# Patient Record
Sex: FEMALE | Race: WHITE | Employment: OTHER | URBAN - METROPOLITAN AREA
[De-identification: names, ages, dates, MRNs, and addresses within clinical notes are randomized per-mention and may not be internally consistent; named-entity substitution may affect disease eponyms.]

---

## 2017-01-05 ENCOUNTER — TELEPHONE (OUTPATIENT)
Dept: INTERNAL MEDICINE CLINIC | Facility: CLINIC | Age: 82
End: 2017-01-05

## 2017-01-05 NOTE — TELEPHONE ENCOUNTER
[facility]---[phone]    Re: [place]    Wanted to give you her blood pressure readings for the past week:    12-30-16----143/64  12-31-16-----145/60    1-1-17------147/64    1-2-17-----153/64    1-3-17-----168/70    1-4-17---

## 2017-01-16 ENCOUNTER — OFFICE VISIT (OUTPATIENT)
Dept: INTERNAL MEDICINE CLINIC | Facility: CLINIC | Age: 82
End: 2017-01-16

## 2017-01-16 VITALS
HEART RATE: 60 BPM | BODY MASS INDEX: 31.91 KG/M2 | WEIGHT: 169 LBS | TEMPERATURE: 98 F | SYSTOLIC BLOOD PRESSURE: 144 MMHG | DIASTOLIC BLOOD PRESSURE: 50 MMHG | HEIGHT: 61 IN

## 2017-01-16 DIAGNOSIS — E11.9 TYPE 2 DIABETES MELLITUS WITHOUT COMPLICATION, WITHOUT LONG-TERM CURRENT USE OF INSULIN (HCC): ICD-10-CM

## 2017-01-16 DIAGNOSIS — I50.21 ACUTE SYSTOLIC CONGESTIVE HEART FAILURE (HCC): ICD-10-CM

## 2017-01-16 DIAGNOSIS — I48.20 CHRONIC ATRIAL FIBRILLATION (HCC): ICD-10-CM

## 2017-01-16 DIAGNOSIS — I10 ESSENTIAL HYPERTENSION WITH GOAL BLOOD PRESSURE LESS THAN 130/85: Primary | ICD-10-CM

## 2017-01-16 LAB
ANION GAP SERPL CALC-SCNC: 12 MMOL/L (ref 0–18)
BUN SERPL-MCNC: 45 MG/DL (ref 8–20)
BUN/CREAT SERPL: 34.9 (ref 10–20)
CALCIUM SERPL-MCNC: 9 MG/DL (ref 8.5–10.5)
CHLORIDE SERPL-SCNC: 104 MMOL/L (ref 95–110)
CO2 SERPL-SCNC: 20 MMOL/L (ref 22–32)
CREAT SERPL-MCNC: 1.29 MG/DL (ref 0.5–1.5)
GLUCOSE SERPL-MCNC: 109 MG/DL (ref 70–99)
OSMOLALITY UR CALC.SUM OF ELEC: 294 MOSM/KG (ref 275–295)
POTASSIUM SERPL-SCNC: 4.5 MMOL/L (ref 3.3–5.1)
SODIUM SERPL-SCNC: 136 MMOL/L (ref 136–144)

## 2017-01-16 PROCEDURE — 36415 COLL VENOUS BLD VENIPUNCTURE: CPT | Performed by: INTERNAL MEDICINE

## 2017-01-16 PROCEDURE — G0463 HOSPITAL OUTPT CLINIC VISIT: HCPCS | Performed by: INTERNAL MEDICINE

## 2017-01-16 PROCEDURE — 99214 OFFICE O/P EST MOD 30 MIN: CPT | Performed by: INTERNAL MEDICINE

## 2017-01-16 NOTE — PROGRESS NOTES
HPI:    Patient ID: Travis Adame is a 80year old female. Hypertension  This is a chronic problem. The current episode started more than 1 year ago. The problem is unchanged. The problem is controlled.  Pertinent negatives include no anxiety, blurred vi neck pain and neck stiffness. Allergic/Immunologic: Negative for immunocompromised state. Neurological: Negative for dizziness, syncope, facial asymmetry, weakness, light-headedness, numbness and headaches.             Current Outpatient Prescriptions: Abdominal: She exhibits no distension and no mass. There is no tenderness. There is no rebound and no guarding. Musculoskeletal: She exhibits edema. She exhibits no tenderness. Neurological: She is alert and oriented to person, place, and time.  She e

## 2017-02-13 RX ORDER — HYDROCODONE BITARTRATE AND ACETAMINOPHEN 10; 325 MG/1; MG/1
1 TABLET ORAL EVERY 4 HOURS PRN
Qty: 120 TABLET | Refills: 0 | Status: SHIPPED | OUTPATIENT
Start: 2017-02-13 | End: 2017-04-10

## 2017-03-27 ENCOUNTER — OFFICE VISIT (OUTPATIENT)
Dept: INTERNAL MEDICINE CLINIC | Facility: CLINIC | Age: 82
End: 2017-03-27

## 2017-03-27 VITALS
HEIGHT: 61 IN | DIASTOLIC BLOOD PRESSURE: 50 MMHG | WEIGHT: 164.63 LBS | TEMPERATURE: 98 F | BODY MASS INDEX: 31.08 KG/M2 | HEART RATE: 68 BPM | SYSTOLIC BLOOD PRESSURE: 140 MMHG

## 2017-03-27 DIAGNOSIS — I48.20 CHRONIC ATRIAL FIBRILLATION (HCC): ICD-10-CM

## 2017-03-27 DIAGNOSIS — E78.00 PURE HYPERCHOLESTEROLEMIA: ICD-10-CM

## 2017-03-27 DIAGNOSIS — E11.9 TYPE 2 DIABETES MELLITUS WITHOUT COMPLICATION, WITHOUT LONG-TERM CURRENT USE OF INSULIN (HCC): ICD-10-CM

## 2017-03-27 DIAGNOSIS — I50.21 ACUTE SYSTOLIC CONGESTIVE HEART FAILURE (HCC): ICD-10-CM

## 2017-03-27 DIAGNOSIS — I10 ESSENTIAL HYPERTENSION WITH GOAL BLOOD PRESSURE LESS THAN 130/85: Primary | ICD-10-CM

## 2017-03-27 LAB
ALBUMIN SERPL BCP-MCNC: 3.9 G/DL (ref 3.5–4.8)
ALBUMIN/GLOB SERPL: 1.6 {RATIO} (ref 1–2)
ALP SERPL-CCNC: 80 U/L (ref 32–100)
ALT SERPL-CCNC: 13 U/L (ref 14–54)
ANION GAP SERPL CALC-SCNC: 7 MMOL/L (ref 0–18)
AST SERPL-CCNC: 19 U/L (ref 15–41)
BASOPHILS # BLD: 0 K/UL (ref 0–0.2)
BASOPHILS NFR BLD: 0 %
BILIRUB SERPL-MCNC: 0.4 MG/DL (ref 0.3–1.2)
BUN SERPL-MCNC: 40 MG/DL (ref 8–20)
BUN/CREAT SERPL: 33.9 (ref 10–20)
CALCIUM SERPL-MCNC: 9.1 MG/DL (ref 8.5–10.5)
CHLORIDE SERPL-SCNC: 108 MMOL/L (ref 95–110)
CO2 SERPL-SCNC: 24 MMOL/L (ref 22–32)
CREAT SERPL-MCNC: 1.18 MG/DL (ref 0.5–1.5)
EOSINOPHIL # BLD: 0.2 K/UL (ref 0–0.7)
EOSINOPHIL NFR BLD: 4 %
ERYTHROCYTE [DISTWIDTH] IN BLOOD BY AUTOMATED COUNT: 16.9 % (ref 11–15)
GLOBULIN PLAS-MCNC: 2.4 G/DL (ref 2.5–3.7)
GLUCOSE SERPL-MCNC: 114 MG/DL (ref 70–99)
HCT VFR BLD AUTO: 33.1 % (ref 35–48)
HGB BLD-MCNC: 10.9 G/DL (ref 12–16)
LYMPHOCYTES # BLD: 0.9 K/UL (ref 1–4)
LYMPHOCYTES NFR BLD: 14 %
MCH RBC QN AUTO: 29.6 PG (ref 27–32)
MCHC RBC AUTO-ENTMCNC: 32.8 G/DL (ref 32–37)
MCV RBC AUTO: 90.4 FL (ref 80–100)
MONOCYTES # BLD: 0.5 K/UL (ref 0–1)
MONOCYTES NFR BLD: 8 %
NEUTROPHILS # BLD AUTO: 4.9 K/UL (ref 1.8–7.7)
NEUTROPHILS NFR BLD: 75 %
OSMOLALITY UR CALC.SUM OF ELEC: 299 MOSM/KG (ref 275–295)
PLATELET # BLD AUTO: 201 K/UL (ref 140–400)
PMV BLD AUTO: 7.9 FL (ref 7.4–10.3)
POTASSIUM SERPL-SCNC: 4.4 MMOL/L (ref 3.3–5.1)
PROT SERPL-MCNC: 6.3 G/DL (ref 5.9–8.4)
RBC # BLD AUTO: 3.67 M/UL (ref 3.7–5.4)
SODIUM SERPL-SCNC: 139 MMOL/L (ref 136–144)
WBC # BLD AUTO: 6.5 K/UL (ref 4–11)

## 2017-03-27 PROCEDURE — 99214 OFFICE O/P EST MOD 30 MIN: CPT | Performed by: INTERNAL MEDICINE

## 2017-03-27 PROCEDURE — G0463 HOSPITAL OUTPT CLINIC VISIT: HCPCS | Performed by: INTERNAL MEDICINE

## 2017-03-27 NOTE — PROGRESS NOTES
HPI:    Patient ID: Christopher Sherwood is a 80year old female. Hypertension  This is a chronic problem. The current episode started more than 1 year ago. The problem is unchanged. The problem is controlled.  Pertinent negatives include no anxiety, blurred vi Negative for dizziness, syncope, facial asymmetry, weakness, light-headedness, numbness and headaches. Current Outpatient Prescriptions:  HYDROcodone-acetaminophen  MG Oral Tab Take 1 tablet by mouth every 4 (four) hours as needed.  Disp: 1 exhibits no edema or tenderness. Neurological: She is alert and oriented to person, place, and time. She exhibits normal muscle tone. Coordination and gait abnormal.   Walks with walker. Skin: Skin is warm. No rash noted. No erythema. No pallor.    Psy

## 2017-04-10 RX ORDER — HYDROCODONE BITARTRATE AND ACETAMINOPHEN 10; 325 MG/1; MG/1
1 TABLET ORAL EVERY 4 HOURS PRN
Qty: 120 TABLET | Refills: 0 | Status: SHIPPED | OUTPATIENT
Start: 2017-04-10 | End: 2017-06-05

## 2017-05-19 RX ORDER — PANTOPRAZOLE SODIUM 40 MG/1
TABLET, DELAYED RELEASE ORAL
Qty: 30 TABLET | Refills: 11 | Status: SHIPPED | OUTPATIENT
Start: 2017-05-19 | End: 2018-04-26

## 2017-05-19 RX ORDER — ALPRAZOLAM 0.25 MG/1
TABLET ORAL
Qty: 30 TABLET | Refills: 5 | Status: SHIPPED
Start: 2017-05-19 | End: 2017-12-18

## 2017-06-05 RX ORDER — HYDROCODONE BITARTRATE AND ACETAMINOPHEN 10; 325 MG/1; MG/1
1 TABLET ORAL EVERY 4 HOURS PRN
Qty: 120 TABLET | Refills: 0 | Status: SHIPPED | OUTPATIENT
Start: 2017-06-05 | End: 2017-07-31

## 2017-06-26 ENCOUNTER — OFFICE VISIT (OUTPATIENT)
Dept: INTERNAL MEDICINE CLINIC | Facility: CLINIC | Age: 82
End: 2017-06-26

## 2017-06-26 VITALS
WEIGHT: 166 LBS | BODY MASS INDEX: 31.34 KG/M2 | DIASTOLIC BLOOD PRESSURE: 66 MMHG | SYSTOLIC BLOOD PRESSURE: 110 MMHG | TEMPERATURE: 98 F | HEIGHT: 61 IN | HEART RATE: 60 BPM

## 2017-06-26 DIAGNOSIS — I48.20 CHRONIC ATRIAL FIBRILLATION (HCC): ICD-10-CM

## 2017-06-26 DIAGNOSIS — I50.21 ACUTE SYSTOLIC CONGESTIVE HEART FAILURE (HCC): ICD-10-CM

## 2017-06-26 DIAGNOSIS — E11.9 TYPE 2 DIABETES MELLITUS WITHOUT COMPLICATION, WITHOUT LONG-TERM CURRENT USE OF INSULIN (HCC): ICD-10-CM

## 2017-06-26 DIAGNOSIS — I10 ESSENTIAL HYPERTENSION WITH GOAL BLOOD PRESSURE LESS THAN 130/85: Primary | ICD-10-CM

## 2017-06-26 PROCEDURE — 99214 OFFICE O/P EST MOD 30 MIN: CPT | Performed by: INTERNAL MEDICINE

## 2017-06-26 PROCEDURE — G0463 HOSPITAL OUTPT CLINIC VISIT: HCPCS | Performed by: INTERNAL MEDICINE

## 2017-06-26 PROCEDURE — 36415 COLL VENOUS BLD VENIPUNCTURE: CPT | Performed by: INTERNAL MEDICINE

## 2017-06-26 RX ORDER — FUROSEMIDE 20 MG/1
20 TABLET ORAL AS NEEDED
Refills: 0 | Status: ON HOLD | COMMUNITY
Start: 2017-05-01 | End: 2019-01-03

## 2017-06-26 RX ORDER — BETAMETHASONE DIPROPIONATE 0.5 MG/G
OINTMENT TOPICAL
Qty: 15 G | Refills: 4 | Status: ON HOLD | OUTPATIENT
Start: 2017-06-26 | End: 2018-11-04

## 2017-06-26 NOTE — PROGRESS NOTES
HPI:    Patient ID: Meka Puckett is a 80year old female. Hypertension   This is a chronic problem. The current episode started more than 1 year ago. The problem is unchanged. The problem is controlled. Associated symptoms include peripheral edema.  Per Neurological: Negative for dizziness, syncope, facial asymmetry, weakness, light-headedness, numbness and headaches. Current Outpatient Prescriptions:  furosemide 20 MG Oral Tab Take 20 mg by mouth as needed.    Disp:  Rfl: 0   Betamethasone Bakari Hensley Abdominal: She exhibits no distension and no mass. There is no tenderness. There is no rebound and no guarding. Musculoskeletal: She exhibits edema. She exhibits no tenderness. Neurological: She is alert and oriented to person, place, and time.  She e

## 2017-07-31 RX ORDER — HYDROCODONE BITARTRATE AND ACETAMINOPHEN 10; 325 MG/1; MG/1
1 TABLET ORAL EVERY 4 HOURS PRN
Qty: 120 TABLET | Refills: 0 | Status: SHIPPED | OUTPATIENT
Start: 2017-07-31 | End: 2017-09-25

## 2017-08-21 RX ORDER — VALSARTAN AND HYDROCHLOROTHIAZIDE 320; 25 MG/1; MG/1
TABLET, FILM COATED ORAL
Qty: 90 TABLET | Refills: 3 | Status: SHIPPED | OUTPATIENT
Start: 2017-08-21 | End: 2018-07-30

## 2017-09-25 ENCOUNTER — OFFICE VISIT (OUTPATIENT)
Dept: INTERNAL MEDICINE CLINIC | Facility: CLINIC | Age: 82
End: 2017-09-25

## 2017-09-25 VITALS
TEMPERATURE: 99 F | DIASTOLIC BLOOD PRESSURE: 58 MMHG | SYSTOLIC BLOOD PRESSURE: 116 MMHG | WEIGHT: 158 LBS | HEIGHT: 61 IN | HEART RATE: 64 BPM | BODY MASS INDEX: 29.83 KG/M2

## 2017-09-25 DIAGNOSIS — E78.00 PURE HYPERCHOLESTEROLEMIA: ICD-10-CM

## 2017-09-25 DIAGNOSIS — I10 ESSENTIAL HYPERTENSION WITH GOAL BLOOD PRESSURE LESS THAN 130/85: Primary | ICD-10-CM

## 2017-09-25 DIAGNOSIS — I48.20 CHRONIC ATRIAL FIBRILLATION (HCC): ICD-10-CM

## 2017-09-25 DIAGNOSIS — E11.9 TYPE 2 DIABETES MELLITUS WITHOUT COMPLICATION, WITHOUT LONG-TERM CURRENT USE OF INSULIN (HCC): ICD-10-CM

## 2017-09-25 PROCEDURE — 99214 OFFICE O/P EST MOD 30 MIN: CPT | Performed by: INTERNAL MEDICINE

## 2017-09-25 PROCEDURE — G0463 HOSPITAL OUTPT CLINIC VISIT: HCPCS | Performed by: INTERNAL MEDICINE

## 2017-09-25 PROCEDURE — 36415 COLL VENOUS BLD VENIPUNCTURE: CPT | Performed by: INTERNAL MEDICINE

## 2017-09-25 PROCEDURE — G0008 ADMIN INFLUENZA VIRUS VAC: HCPCS | Performed by: INTERNAL MEDICINE

## 2017-09-25 PROCEDURE — 90653 IIV ADJUVANT VACCINE IM: CPT | Performed by: INTERNAL MEDICINE

## 2017-09-25 RX ORDER — HYDROCODONE BITARTRATE AND ACETAMINOPHEN 10; 325 MG/1; MG/1
1 TABLET ORAL EVERY 4 HOURS PRN
Qty: 120 TABLET | Refills: 0 | Status: SHIPPED | OUTPATIENT
Start: 2017-09-25 | End: 2017-11-20

## 2017-09-25 NOTE — PROGRESS NOTES
HPI:    Patient ID: Juani Delgado is a 80year old female. Hypertension   This is a chronic problem. The current episode started more than 1 year ago. The problem is unchanged. The problem is controlled.  Pertinent negatives include no anxiety, blurred v immunocompromised state. Neurological: Negative for dizziness, syncope, facial asymmetry, weakness, light-headedness, numbness and headaches.             Current Outpatient Prescriptions:  HYDROcodone-acetaminophen  MG Oral Tab Take 1 tablet by mout exhibits no tenderness. Abdominal: She exhibits no distension and no mass. There is no tenderness. There is no rebound and no guarding. Musculoskeletal: She exhibits no edema or tenderness. Ambulates with a walker.     Neurological: She is alert and o

## 2017-09-26 LAB
ABSOLUTE BASOPHILS: 21 CELLS/UL (ref 0–200)
ABSOLUTE EOSINOPHILS: 161 CELLS/UL (ref 15–500)
ABSOLUTE LYMPHOCYTES: 847 CELLS/UL (ref 850–3900)
ABSOLUTE MONOCYTES: 441 CELLS/UL (ref 200–950)
ABSOLUTE NEUTROPHILS: 5530 CELLS/UL (ref 1500–7800)
ALBUMIN/GLOBULIN RATIO: 2 (CALC) (ref 1–2.5)
ALBUMIN: 4.3 G/DL (ref 3.6–5.1)
ALKALINE PHOSPHATASE: 77 U/L (ref 33–130)
ALT: 10 U/L (ref 6–29)
AST: 13 U/L (ref 10–35)
BASOPHILS: 0.3 %
BILIRUBIN, TOTAL: 0.4 MG/DL (ref 0.2–1.2)
BUN/CREATININE RATIO: 28 (CALC) (ref 6–22)
BUN: 32 MG/DL (ref 7–25)
CALCIUM: 9.2 MG/DL (ref 8.6–10.4)
CARBON DIOXIDE: 24 MMOL/L (ref 20–31)
CHLORIDE: 105 MMOL/L (ref 98–110)
CHOL/HDLC RATIO: 2.1 (CALC)
CHOLESTEROL, TOTAL: 116 MG/DL
CREATININE: 1.13 MG/DL (ref 0.6–0.88)
EGFR IF AFRICN AM: 48 ML/MIN/1.73M2
EGFR IF NONAFRICN AM: 41 ML/MIN/1.73M2
EOSINOPHILS: 2.3 %
GLOBULIN: 2.2 G/DL (CALC) (ref 1.9–3.7)
GLUCOSE: 120 MG/DL (ref 65–99)
HDL CHOLESTEROL: 55 MG/DL
HEMATOCRIT: 33.5 % (ref 35–45)
HEMOGLOBIN: 10.8 G/DL (ref 11.7–15.5)
LDL-CHOLESTEROL: 41 MG/DL (CALC)
LYMPHOCYTES: 12.1 %
MCH: 29.7 PG (ref 27–33)
MCHC: 32.2 G/DL (ref 32–36)
MCV: 92 FL (ref 80–100)
MONOCYTES: 6.3 %
MPV: 9.7 FL (ref 7.5–12.5)
NEUTROPHILS: 79 %
NON-HDL CHOLESTEROL: 61 MG/DL (CALC)
PLATELET COUNT: 207 THOUSAND/UL (ref 140–400)
POTASSIUM: 3.9 MMOL/L (ref 3.5–5.3)
PROTEIN, TOTAL: 6.5 G/DL (ref 6.1–8.1)
RDW: 14.1 % (ref 11–15)
RED BLOOD CELL COUNT: 3.64 MILLION/UL (ref 3.8–5.1)
SODIUM: 139 MMOL/L (ref 135–146)
TRIGLYCERIDES: 113 MG/DL
WHITE BLOOD CELL COUNT: 7 THOUSAND/UL (ref 3.8–10.8)

## 2017-10-02 ENCOUNTER — TELEPHONE (OUTPATIENT)
Dept: INTERNAL MEDICINE CLINIC | Facility: CLINIC | Age: 82
End: 2017-10-02

## 2017-11-21 RX ORDER — HYDROCODONE BITARTRATE AND ACETAMINOPHEN 10; 325 MG/1; MG/1
1 TABLET ORAL EVERY 4 HOURS PRN
Qty: 120 TABLET | Refills: 0 | Status: SHIPPED | OUTPATIENT
Start: 2017-11-21 | End: 2017-12-18

## 2017-11-27 RX ORDER — ATORVASTATIN CALCIUM 10 MG/1
10 TABLET, FILM COATED ORAL NIGHTLY
Qty: 90 TABLET | Refills: 3 | Status: SHIPPED | OUTPATIENT
Start: 2017-11-27 | End: 2018-11-22

## 2017-11-27 RX ORDER — ISOSORBIDE MONONITRATE 60 MG/1
60 TABLET, EXTENDED RELEASE ORAL DAILY
Qty: 90 TABLET | Refills: 3 | Status: SHIPPED | OUTPATIENT
Start: 2017-11-27 | End: 2018-11-22

## 2017-12-04 RX ORDER — APIXABAN 2.5 MG/1
TABLET, FILM COATED ORAL
Qty: 60 TABLET | Refills: 0 | Status: SHIPPED | OUTPATIENT
Start: 2017-12-04 | End: 2018-01-04

## 2017-12-18 ENCOUNTER — OFFICE VISIT (OUTPATIENT)
Dept: INTERNAL MEDICINE CLINIC | Facility: CLINIC | Age: 82
End: 2017-12-18

## 2017-12-18 VITALS
WEIGHT: 158 LBS | BODY MASS INDEX: 29.83 KG/M2 | HEIGHT: 61 IN | TEMPERATURE: 98 F | DIASTOLIC BLOOD PRESSURE: 70 MMHG | HEART RATE: 60 BPM | SYSTOLIC BLOOD PRESSURE: 138 MMHG

## 2017-12-18 DIAGNOSIS — I10 ESSENTIAL HYPERTENSION WITH GOAL BLOOD PRESSURE LESS THAN 130/85: Primary | ICD-10-CM

## 2017-12-18 DIAGNOSIS — I48.20 CHRONIC ATRIAL FIBRILLATION (HCC): ICD-10-CM

## 2017-12-18 DIAGNOSIS — E11.9 TYPE 2 DIABETES MELLITUS WITHOUT COMPLICATION, WITHOUT LONG-TERM CURRENT USE OF INSULIN (HCC): ICD-10-CM

## 2017-12-18 DIAGNOSIS — E78.00 PURE HYPERCHOLESTEROLEMIA: ICD-10-CM

## 2017-12-18 PROCEDURE — G0463 HOSPITAL OUTPT CLINIC VISIT: HCPCS | Performed by: INTERNAL MEDICINE

## 2017-12-18 PROCEDURE — 99214 OFFICE O/P EST MOD 30 MIN: CPT | Performed by: INTERNAL MEDICINE

## 2017-12-18 RX ORDER — ALPRAZOLAM 0.25 MG/1
TABLET ORAL
Qty: 30 TABLET | Refills: 5 | Status: SHIPPED
Start: 2017-12-18 | End: 2018-07-16

## 2017-12-18 RX ORDER — HYDROCODONE BITARTRATE AND ACETAMINOPHEN 10; 325 MG/1; MG/1
1 TABLET ORAL EVERY 4 HOURS PRN
Qty: 120 TABLET | Refills: 0 | Status: SHIPPED | OUTPATIENT
Start: 2017-12-21 | End: 2018-03-19

## 2017-12-19 NOTE — PROGRESS NOTES
HPI:    Patient ID: Travis Adame is a 80year old female. Hypertension   This is a chronic problem. The current episode started more than 1 year ago. The problem is unchanged. The problem is controlled.  Pertinent negatives include no anxiety, blurred v Neurological: Negative for dizziness, syncope, facial asymmetry, weakness, light-headedness, numbness and headaches.             Current Outpatient Prescriptions:  [START ON 12/21/2017] HYDROcodone-acetaminophen  MG Oral Tab Take 1 tablet by mouth e normal and breath sounds normal. No respiratory distress. She has no wheezes. She has no rales. She exhibits no tenderness. Abdominal: She exhibits no distension and no mass. There is no tenderness. There is no rebound and no guarding.    Musculoskeletal:

## 2018-01-01 ENCOUNTER — HOSPITAL ENCOUNTER (EMERGENCY)
Facility: HOSPITAL | Age: 83
Discharge: HOME | End: 2018-01-01
Attending: EMERGENCY MEDICINE
Payer: MEDICARE

## 2018-01-01 ENCOUNTER — APPOINTMENT (OUTPATIENT)
Dept: GENERAL RADIOLOGY | Facility: HOSPITAL | Age: 83
End: 2018-01-01
Attending: EMERGENCY MEDICINE
Payer: MEDICARE

## 2018-01-01 VITALS
OXYGEN SATURATION: 95 % | DIASTOLIC BLOOD PRESSURE: 57 MMHG | SYSTOLIC BLOOD PRESSURE: 188 MMHG | TEMPERATURE: 98 F | HEIGHT: 62 IN | WEIGHT: 160 LBS | BODY MASS INDEX: 29.44 KG/M2 | HEART RATE: 68 BPM | RESPIRATION RATE: 20 BRPM

## 2018-01-01 DIAGNOSIS — J06.9 UPPER RESPIRATORY TRACT INFECTION, UNSPECIFIED TYPE: Primary | ICD-10-CM

## 2018-01-01 DIAGNOSIS — B97.4 RESPIRATORY SYNCYTIAL VIRUS (RSV): ICD-10-CM

## 2018-01-01 LAB

## 2018-01-01 PROCEDURE — 87581 M.PNEUMON DNA AMP PROBE: CPT | Performed by: EMERGENCY MEDICINE

## 2018-01-01 PROCEDURE — 94640 AIRWAY INHALATION TREATMENT: CPT

## 2018-01-01 PROCEDURE — 87633 RESP VIRUS 12-25 TARGETS: CPT | Performed by: EMERGENCY MEDICINE

## 2018-01-01 PROCEDURE — 71046 X-RAY EXAM CHEST 2 VIEWS: CPT | Performed by: EMERGENCY MEDICINE

## 2018-01-01 PROCEDURE — 87798 DETECT AGENT NOS DNA AMP: CPT | Performed by: EMERGENCY MEDICINE

## 2018-01-01 PROCEDURE — 99284 EMERGENCY DEPT VISIT MOD MDM: CPT

## 2018-01-01 PROCEDURE — 87486 CHLMYD PNEUM DNA AMP PROBE: CPT | Performed by: EMERGENCY MEDICINE

## 2018-01-01 RX ORDER — CODEINE PHOSPHATE AND GUAIFENESIN 10; 100 MG/5ML; MG/5ML
5 SOLUTION ORAL 3 TIMES DAILY PRN
Qty: 180 ML | Refills: 0 | Status: SHIPPED | OUTPATIENT
Start: 2018-01-01 | End: 2018-01-06

## 2018-01-01 RX ORDER — IPRATROPIUM BROMIDE AND ALBUTEROL SULFATE 2.5; .5 MG/3ML; MG/3ML
3 SOLUTION RESPIRATORY (INHALATION) ONCE
Status: COMPLETED | OUTPATIENT
Start: 2018-01-01 | End: 2018-01-01

## 2018-01-01 NOTE — ED NOTES
Discharged home into care of family with plan to follow up with PCP as indicated. Alert and interactive. Hemodynamically stable.   WC assist to exit

## 2018-01-01 NOTE — ED PROVIDER NOTES
Patient Seen in: [facility] AND [facility] Emergency Department    History   Patient presents with:  Cough/URI    Stated Complaint:     HPI    Patient is a 69-year-old female who presents with cough ×4-5 days.   Patient denies any fevers, body aches, chest pain o EOMI, PERRL  Neck: supple, non tender, no jvd  CV:afib, no murmurs  Resp: intermittent expiratory wheeze b/l, no retractions. +hacking cough intermittent  Extremities: FROM of all extremities.  1+ pitting edema BLE L>R  Neuro: CN intact, normal speech, norm influenza negative but RSV positive. Patient and family notified of results and no further treatment at this time.          Disposition and Plan     Clinical Impression:  Upper respiratory tract infection, unspecified type  (primary encounter diagnosis)  Re

## 2018-01-01 NOTE — ED NOTES
Care assumed from triage presents from home with 4 day hx of cough productive for white sputum Respirations even non labored + moist cough + end expiratory wheeze + dependent lower extremity edema L>R Family at bedside

## 2018-01-03 ENCOUNTER — TELEPHONE (OUTPATIENT)
Dept: INTERNAL MEDICINE CLINIC | Facility: CLINIC | Age: 83
End: 2018-01-03

## 2018-01-03 RX ORDER — METHYLPREDNISOLONE 4 MG/1
TABLET ORAL
Qty: 1 KIT | Refills: 0 | Status: SHIPPED | OUTPATIENT
Start: 2018-01-03 | End: 2018-03-19 | Stop reason: ALTCHOICE

## 2018-01-04 ENCOUNTER — TELEPHONE (OUTPATIENT)
Dept: INTERNAL MEDICINE CLINIC | Facility: CLINIC | Age: 83
End: 2018-01-04

## 2018-01-04 RX ORDER — IPRATROPIUM BROMIDE AND ALBUTEROL SULFATE 2.5; .5 MG/3ML; MG/3ML
3 SOLUTION RESPIRATORY (INHALATION) EVERY 4 HOURS PRN
Qty: 120 ML | Refills: 1 | Status: ON HOLD | OUTPATIENT
Start: 2018-01-04 | End: 2018-11-04

## 2018-01-04 NOTE — TELEPHONE ENCOUNTER
Constant dry cough, is asking if she could have a Nebulizer sent to the pharmacy.       [facility] ON [address] IN [place]

## 2018-01-05 ENCOUNTER — TELEPHONE (OUTPATIENT)
Dept: INTERNAL MEDICINE CLINIC | Facility: CLINIC | Age: 83
End: 2018-01-05

## 2018-01-05 RX ORDER — FLUTICASONE PROPIONATE 50 MCG
2 SPRAY, SUSPENSION (ML) NASAL DAILY
Qty: 1 BOTTLE | Refills: 3 | Status: ON HOLD | OUTPATIENT
Start: 2018-01-05 | End: 2018-11-04

## 2018-01-05 NOTE — TELEPHONE ENCOUNTER
The cough syrup has decongestants in it  The steroids should decrease inflammation and decrease congestion  I called in flonase nasal spray to help also.

## 2018-01-06 ENCOUNTER — HOSPITAL ENCOUNTER (EMERGENCY)
Facility: HOSPITAL | Age: 83
Discharge: HOME | End: 2018-01-06
Attending: EMERGENCY MEDICINE
Payer: MEDICARE

## 2018-01-06 ENCOUNTER — APPOINTMENT (OUTPATIENT)
Dept: GENERAL RADIOLOGY | Facility: HOSPITAL | Age: 83
End: 2018-01-06
Attending: EMERGENCY MEDICINE
Payer: MEDICARE

## 2018-01-06 VITALS
HEART RATE: 71 BPM | SYSTOLIC BLOOD PRESSURE: 162 MMHG | HEIGHT: 62 IN | DIASTOLIC BLOOD PRESSURE: 65 MMHG | TEMPERATURE: 99 F | BODY MASS INDEX: 28.52 KG/M2 | OXYGEN SATURATION: 96 % | RESPIRATION RATE: 20 BRPM | WEIGHT: 155 LBS

## 2018-01-06 DIAGNOSIS — J40 BRONCHITIS: Primary | ICD-10-CM

## 2018-01-06 LAB
ANION GAP SERPL CALC-SCNC: 10 MMOL/L (ref 0–18)
BASOPHILS # BLD: 0 K/UL (ref 0–0.2)
BASOPHILS NFR BLD: 0 %
BILIRUB UR QL: NEGATIVE
BUN SERPL-MCNC: 28 MG/DL (ref 8–20)
BUN/CREAT SERPL: 29.2 (ref 10–20)
CALCIUM SERPL-MCNC: 9 MG/DL (ref 8.5–10.5)
CHLORIDE SERPL-SCNC: 95 MMOL/L (ref 95–110)
CLARITY UR: CLEAR
CO2 SERPL-SCNC: 26 MMOL/L (ref 22–32)
COLOR UR: YELLOW
CREAT SERPL-MCNC: 0.96 MG/DL (ref 0.5–1.5)
EOSINOPHIL # BLD: 0 K/UL (ref 0–0.7)
EOSINOPHIL NFR BLD: 0 %
ERYTHROCYTE [DISTWIDTH] IN BLOOD BY AUTOMATED COUNT: 15.6 % (ref 11–15)
GLUCOSE SERPL-MCNC: 139 MG/DL (ref 70–99)
GLUCOSE UR-MCNC: NEGATIVE MG/DL
HCT VFR BLD AUTO: 35.1 % (ref 35–48)
HGB BLD-MCNC: 11.5 G/DL (ref 12–16)
KETONES UR-MCNC: NEGATIVE MG/DL
LYMPHOCYTES # BLD: 0.6 K/UL (ref 1–4)
LYMPHOCYTES NFR BLD: 5 %
MCH RBC QN AUTO: 30.4 PG (ref 27–32)
MCHC RBC AUTO-ENTMCNC: 32.7 G/DL (ref 32–37)
MCV RBC AUTO: 93 FL (ref 80–100)
MONOCYTES # BLD: 0.9 K/UL (ref 0–1)
MONOCYTES NFR BLD: 8 %
NEUTROPHILS # BLD AUTO: 9.8 K/UL (ref 1.8–7.7)
NEUTROPHILS NFR BLD: 87 %
NITRITE UR QL STRIP.AUTO: NEGATIVE
OSMOLALITY UR CALC.SUM OF ELEC: 280 MOSM/KG (ref 275–295)
PH UR: 6 [PH] (ref 5–8)
PLATELET # BLD AUTO: 244 K/UL (ref 140–400)
PMV BLD AUTO: 7.7 FL (ref 7.4–10.3)
POTASSIUM SERPL-SCNC: 4.1 MMOL/L (ref 3.3–5.1)
PROT UR-MCNC: 30 MG/DL
RBC # BLD AUTO: 3.77 M/UL (ref 3.7–5.4)
RBC #/AREA URNS AUTO: 2 /HPF
SODIUM SERPL-SCNC: 131 MMOL/L (ref 136–144)
SP GR UR STRIP: 1.01 (ref 1–1.03)
UROBILINOGEN UR STRIP-ACNC: 2
VIT C UR-MCNC: NEGATIVE MG/DL
WBC # BLD AUTO: 11.3 K/UL (ref 4–11)
WBC #/AREA URNS AUTO: 3 /HPF

## 2018-01-06 PROCEDURE — 71045 X-RAY EXAM CHEST 1 VIEW: CPT | Performed by: EMERGENCY MEDICINE

## 2018-01-06 PROCEDURE — 81001 URINALYSIS AUTO W/SCOPE: CPT | Performed by: EMERGENCY MEDICINE

## 2018-01-06 PROCEDURE — 80048 BASIC METABOLIC PNL TOTAL CA: CPT | Performed by: EMERGENCY MEDICINE

## 2018-01-06 PROCEDURE — 99284 EMERGENCY DEPT VISIT MOD MDM: CPT

## 2018-01-06 PROCEDURE — 36415 COLL VENOUS BLD VENIPUNCTURE: CPT

## 2018-01-06 PROCEDURE — 85025 COMPLETE CBC W/AUTO DIFF WBC: CPT | Performed by: EMERGENCY MEDICINE

## 2018-01-06 RX ORDER — AZITHROMYCIN 250 MG/1
TABLET, FILM COATED ORAL
Qty: 1 PACKAGE | Refills: 0 | Status: SHIPPED | OUTPATIENT
Start: 2018-01-06 | End: 2018-03-19 | Stop reason: ALTCHOICE

## 2018-01-06 RX ORDER — ACETAMINOPHEN 500 MG
500 TABLET ORAL ONCE
Status: COMPLETED | OUTPATIENT
Start: 2018-01-06 | End: 2018-01-06

## 2018-01-07 NOTE — ED PROVIDER NOTES
Patient Seen in: [facility] AND [facility] Emergency Department    History   Patient presents with:  Fever (infectious)      HPI    Patient presents complaining of a dry cough and fever for the past 5 days.   Symptoms are moderate in severity, cough worse at nigh elements reviewed from today, pertinent positives to the presenting problem noted.     Physical Exam     ED Triage Vitals [01/06/18 1523]  BP: (!) 174/61  Pulse: 73  Resp: 20  Temp: 98.9 °F (37.2 °C)  Temp src: Oral  SpO2: 97 %  O2 Device: None (Room air) CBC WITH DIFFERENTIAL WITH PLATELET.   Procedure                               Abnormality         Status                     ---------                               -----------         ------                     CBC W/ DIFFERENTIAL[[id number]]          Abno Hyperglycemia; Anemia; Azotemia; Acute chest pain; Acute on chronic congestive heart failure, unspecified congestive heart failure type ([facility] Elevated troponin; Acute systolic congestive heart failure ([facility]  [facility] and Type 2 d

## 2018-01-10 ENCOUNTER — TELEPHONE (OUTPATIENT)
Dept: INTERNAL MEDICINE CLINIC | Facility: CLINIC | Age: 83
End: 2018-01-10

## 2018-01-10 RX ORDER — BENZONATATE 100 MG/1
100 CAPSULE ORAL 2 TIMES DAILY PRN
Qty: 10 CAPSULE | Refills: 0 | Status: SHIPPED | OUTPATIENT
Start: 2018-01-10 | End: 2018-03-19 | Stop reason: ALTCHOICE

## 2018-01-10 NOTE — TELEPHONE ENCOUNTER
Patient's daughter Ok Mom called stating that PCP is out of the office. they had gone to the ER in January 1 and also the sixth--Jeannette states that the chest x-ray blood work and everything was normal and she did not have the flu. --Reviewed labs noted th

## 2018-01-11 ENCOUNTER — TELEPHONE (OUTPATIENT)
Dept: INTERNAL MEDICINE CLINIC | Facility: CLINIC | Age: 83
End: 2018-01-11

## 2018-01-11 NOTE — TELEPHONE ENCOUNTER
Rand Rodriguez    Re: Heena Mcgarry      She was in the emergency room last [place] a lot of coughing and nasal congestion, would like to to review her records and then call her .

## 2018-01-12 NOTE — TELEPHONE ENCOUNTER
Spoke with patient's daughter, reassured her that is normal for her 80year old mother who didn't sleep last night due to coughing to be tired and weak. Discussed that her mom should try to sleep upright, in a chair if needed.   ER twice, no  Change in ch

## 2018-01-18 RX ORDER — AMLODIPINE BESYLATE 5 MG/1
TABLET ORAL
Qty: 30 TABLET | Refills: 2 | Status: SHIPPED | OUTPATIENT
Start: 2018-01-18 | End: 2018-10-08 | Stop reason: DRUGHIGH

## 2018-01-18 NOTE — TELEPHONE ENCOUNTER
Hypertensive Medications  Protocol Criteria:  · Appointment scheduled in the past 6 months or in the next 3 months  · BMP or CMP in the past 12 months  · Creatinine result < 2  Recent Outpatient Visits            1 month ago Essential hypertension with Flora San

## 2018-03-19 ENCOUNTER — OFFICE VISIT (OUTPATIENT)
Dept: INTERNAL MEDICINE CLINIC | Facility: CLINIC | Age: 83
End: 2018-03-19

## 2018-03-19 VITALS
WEIGHT: 157 LBS | HEIGHT: 61 IN | SYSTOLIC BLOOD PRESSURE: 120 MMHG | HEART RATE: 64 BPM | DIASTOLIC BLOOD PRESSURE: 54 MMHG | TEMPERATURE: 97 F | BODY MASS INDEX: 29.64 KG/M2

## 2018-03-19 DIAGNOSIS — E11.9 TYPE 2 DIABETES MELLITUS WITHOUT COMPLICATION, WITHOUT LONG-TERM CURRENT USE OF INSULIN (HCC): ICD-10-CM

## 2018-03-19 DIAGNOSIS — E78.00 PURE HYPERCHOLESTEROLEMIA: ICD-10-CM

## 2018-03-19 DIAGNOSIS — I48.20 CHRONIC ATRIAL FIBRILLATION (HCC): ICD-10-CM

## 2018-03-19 DIAGNOSIS — I10 ESSENTIAL HYPERTENSION WITH GOAL BLOOD PRESSURE LESS THAN 130/85: Primary | ICD-10-CM

## 2018-03-19 PROCEDURE — 99214 OFFICE O/P EST MOD 30 MIN: CPT | Performed by: INTERNAL MEDICINE

## 2018-03-19 PROCEDURE — 90670 PCV13 VACCINE IM: CPT | Performed by: INTERNAL MEDICINE

## 2018-03-19 PROCEDURE — G0009 ADMIN PNEUMOCOCCAL VACCINE: HCPCS | Performed by: INTERNAL MEDICINE

## 2018-03-19 PROCEDURE — G0463 HOSPITAL OUTPT CLINIC VISIT: HCPCS | Performed by: INTERNAL MEDICINE

## 2018-03-19 RX ORDER — HYDROCODONE BITARTRATE AND ACETAMINOPHEN 10; 325 MG/1; MG/1
1 TABLET ORAL EVERY 4 HOURS PRN
Qty: 120 TABLET | Refills: 0 | Status: SHIPPED | OUTPATIENT
Start: 2018-03-19 | End: 2018-05-17

## 2018-04-26 RX ORDER — PANTOPRAZOLE SODIUM 40 MG/1
TABLET, DELAYED RELEASE ORAL
Qty: 90 TABLET | Refills: 3 | Status: ON HOLD | OUTPATIENT
Start: 2018-04-26 | End: 2019-01-03

## 2018-05-01 ENCOUNTER — TELEPHONE (OUTPATIENT)
Dept: INTERNAL MEDICINE CLINIC | Facility: CLINIC | Age: 83
End: 2018-05-01

## 2018-05-01 RX ORDER — METHYLPREDNISOLONE 4 MG/1
TABLET ORAL
Qty: 1 KIT | Refills: 0 | Status: SHIPPED | OUTPATIENT
Start: 2018-05-01 | End: 2018-06-25

## 2018-05-01 NOTE — TELEPHONE ENCOUNTER
Patient has had back pain since Thursday, had appointment with ortho tomorrow but cancelled because she wants to see you first, has appointment on Thursday.   Patients last prescription for Medrol Dosepak was 1/2018

## 2018-05-01 NOTE — TELEPHONE ENCOUNTER
Patients daughter was notified that appointment will remain for Thursday at 2pm and Medrol will be sent to pharmacy.

## 2018-05-03 ENCOUNTER — OFFICE VISIT (OUTPATIENT)
Dept: INTERNAL MEDICINE CLINIC | Facility: CLINIC | Age: 83
End: 2018-05-03

## 2018-05-03 VITALS
SYSTOLIC BLOOD PRESSURE: 124 MMHG | HEART RATE: 64 BPM | DIASTOLIC BLOOD PRESSURE: 50 MMHG | WEIGHT: 157 LBS | HEIGHT: 61 IN | TEMPERATURE: 98 F | BODY MASS INDEX: 29.64 KG/M2

## 2018-05-03 DIAGNOSIS — M54.6 ACUTE LEFT-SIDED THORACIC BACK PAIN: Primary | ICD-10-CM

## 2018-05-03 DIAGNOSIS — I10 ESSENTIAL HYPERTENSION WITH GOAL BLOOD PRESSURE LESS THAN 130/85: ICD-10-CM

## 2018-05-03 PROCEDURE — 99212 OFFICE O/P EST SF 10 MIN: CPT | Performed by: INTERNAL MEDICINE

## 2018-05-03 PROCEDURE — 99214 OFFICE O/P EST MOD 30 MIN: CPT | Performed by: INTERNAL MEDICINE

## 2018-05-03 RX ORDER — AMLODIPINE BESYLATE 2.5 MG/1
TABLET ORAL
Refills: 1 | COMMUNITY
Start: 2018-03-19 | End: 2018-10-08 | Stop reason: DRUGHIGH

## 2018-05-03 NOTE — PROGRESS NOTES
HPI:    Patient ID: Diamond Son is a 80year old female. Back Pain   This is a new problem. The current episode started in the past 7 days. The problem occurs constantly. The problem has been gradually improving since onset.  The quality of the pain is Neurological: Negative for dizziness, tingling, syncope, facial asymmetry, weakness, light-headedness, numbness and headaches. Current Outpatient Prescriptions: AmLODIPine Besylate 2.5 MG Oral Tab TAKE 1 TABLET(S) EVERY DAY BY ORAL ROUTE.  Dis Ointment  Disp:  Rfl: 1     Allergies:No Known Allergies   PHYSICAL EXAM:   Physical Exam   Constitutional: She is oriented to person, place, and time. She appears well-developed.    HENT:   Mouth/Throat: Oropharynx is clear and moist.   Eyes: EOM are suzanna

## 2018-05-17 ENCOUNTER — TELEPHONE (OUTPATIENT)
Dept: FAMILY MEDICINE CLINIC | Facility: CLINIC | Age: 83
End: 2018-05-17

## 2018-05-17 RX ORDER — HYDROCODONE BITARTRATE AND ACETAMINOPHEN 10; 325 MG/1; MG/1
1 TABLET ORAL EVERY 4 HOURS PRN
Qty: 120 TABLET | Refills: 0 | Status: SHIPPED | OUTPATIENT
Start: 2018-05-21 | End: 2018-06-25

## 2018-06-25 ENCOUNTER — OFFICE VISIT (OUTPATIENT)
Dept: INTERNAL MEDICINE CLINIC | Facility: CLINIC | Age: 83
End: 2018-06-25

## 2018-06-25 VITALS
BODY MASS INDEX: 29.07 KG/M2 | HEIGHT: 61 IN | TEMPERATURE: 98 F | SYSTOLIC BLOOD PRESSURE: 120 MMHG | DIASTOLIC BLOOD PRESSURE: 70 MMHG | HEART RATE: 60 BPM | WEIGHT: 154 LBS

## 2018-06-25 DIAGNOSIS — E78.00 PURE HYPERCHOLESTEROLEMIA: ICD-10-CM

## 2018-06-25 DIAGNOSIS — I10 ESSENTIAL HYPERTENSION WITH GOAL BLOOD PRESSURE LESS THAN 130/85: Primary | ICD-10-CM

## 2018-06-25 DIAGNOSIS — I48.20 CHRONIC ATRIAL FIBRILLATION (HCC): ICD-10-CM

## 2018-06-25 DIAGNOSIS — E11.9 TYPE 2 DIABETES MELLITUS WITHOUT COMPLICATION, WITHOUT LONG-TERM CURRENT USE OF INSULIN (HCC): ICD-10-CM

## 2018-06-25 PROCEDURE — 99214 OFFICE O/P EST MOD 30 MIN: CPT | Performed by: INTERNAL MEDICINE

## 2018-06-25 PROCEDURE — 99212 OFFICE O/P EST SF 10 MIN: CPT | Performed by: INTERNAL MEDICINE

## 2018-06-25 RX ORDER — HYDROCODONE BITARTRATE AND ACETAMINOPHEN 10; 325 MG/1; MG/1
1 TABLET ORAL EVERY 4 HOURS PRN
Qty: 120 TABLET | Refills: 0 | Status: SHIPPED | OUTPATIENT
Start: 2018-06-25 | End: 2018-06-25

## 2018-06-25 RX ORDER — HYDROCODONE BITARTRATE AND ACETAMINOPHEN 10; 325 MG/1; MG/1
1 TABLET ORAL EVERY 4 HOURS PRN
Qty: 120 TABLET | Refills: 0 | Status: SHIPPED | OUTPATIENT
Start: 2018-07-25 | End: 2018-07-16

## 2018-06-25 NOTE — PROGRESS NOTES
HPI:    Patient ID: Yaneth Cowart is a 80year old female. Hypertension   This is a chronic problem. The current episode started more than 1 year ago. The problem is unchanged. The problem is controlled.  Pertinent negatives include no anxiety, blurred v Neurological: Negative for dizziness, syncope, facial asymmetry, weakness, light-headedness, numbness and headaches.             Current Outpatient Prescriptions:  [START ON 7/25/2018] HYDROcodone-acetaminophen  MG Oral Tab Take 1 tablet by mouth ev and time. She appears well-developed. HENT:   Mouth/Throat: Oropharynx is clear and moist.   Eyes: EOM are normal. Pupils are equal, round, and reactive to light. Neck: Normal range of motion. No thyromegaly present.    Cardiovascular: Normal rate, regu

## 2018-07-16 ENCOUNTER — TELEPHONE (OUTPATIENT)
Dept: FAMILY MEDICINE CLINIC | Facility: CLINIC | Age: 83
End: 2018-07-16

## 2018-07-16 RX ORDER — ALPRAZOLAM 0.25 MG/1
TABLET ORAL
Qty: 30 TABLET | Refills: 5 | Status: ON HOLD
Start: 2018-07-16 | End: 2018-11-04

## 2018-07-16 RX ORDER — HYDROCODONE BITARTRATE AND ACETAMINOPHEN 10; 325 MG/1; MG/1
1 TABLET ORAL EVERY 4 HOURS PRN
Qty: 120 TABLET | Refills: 0 | Status: SHIPPED | OUTPATIENT
Start: 2018-07-19 | End: 2018-08-27

## 2018-07-16 NOTE — TELEPHONE ENCOUNTER
Needs her Norco for Thursday toward the evening and her sleeping meds sent to heR   pharmacy--cannot find in her chart.       [facility]

## 2018-07-30 ENCOUNTER — TELEPHONE (OUTPATIENT)
Dept: FAMILY MEDICINE CLINIC | Facility: CLINIC | Age: 83
End: 2018-07-30

## 2018-07-30 RX ORDER — HYDROCHLOROTHIAZIDE 25 MG/1
25 TABLET ORAL DAILY
Qty: 90 TABLET | Refills: 3 | Status: ON HOLD | OUTPATIENT
Start: 2018-07-30 | End: 2020-03-23

## 2018-07-30 RX ORDER — IRBESARTAN 300 MG/1
300 TABLET ORAL NIGHTLY
Qty: 90 TABLET | Refills: 3 | Status: ON HOLD | OUTPATIENT
Start: 2018-07-30 | End: 2020-03-23 | Stop reason: ALTCHOICE

## 2018-07-30 NOTE — TELEPHONE ENCOUNTER
Concerned about the Valsartan recall ? ?     [facility]-----[place]

## 2018-07-30 NOTE — TELEPHONE ENCOUNTER
I spoke with the patient, she understood but wanted the medication sent to [facility].  Irbesartan went to [facility] but HCTZ went to [facility].

## 2018-07-30 NOTE — TELEPHONE ENCOUNTER
Please call patient , I ordered new medication  Irbesartan 300 and hctz 25 mg , two separate Rx's because there is not a combination pill which is equivalent to the Valsartan 320/hctz 25 mg.

## 2018-08-27 RX ORDER — HYDROCODONE BITARTRATE AND ACETAMINOPHEN 10; 325 MG/1; MG/1
1 TABLET ORAL EVERY 4 HOURS PRN
Qty: 120 TABLET | Refills: 0 | Status: SHIPPED | OUTPATIENT
Start: 2018-08-27 | End: 2018-10-08

## 2018-09-13 ENCOUNTER — TELEPHONE (OUTPATIENT)
Dept: FAMILY MEDICINE CLINIC | Facility: CLINIC | Age: 83
End: 2018-09-13

## 2018-09-13 RX ORDER — MECLIZINE HCL 12.5 MG/1
12.5 TABLET ORAL 3 TIMES DAILY PRN
COMMUNITY
End: 2018-09-13

## 2018-09-13 RX ORDER — MECLIZINE HCL 12.5 MG/1
12.5 TABLET ORAL 3 TIMES DAILY PRN
Qty: 30 TABLET | Refills: 0 | Status: SHIPPED | OUTPATIENT
Start: 2018-09-13 | End: 2018-10-08 | Stop reason: ALTCHOICE

## 2018-10-08 ENCOUNTER — OFFICE VISIT (OUTPATIENT)
Dept: INTERNAL MEDICINE CLINIC | Facility: CLINIC | Age: 83
End: 2018-10-08
Payer: MEDICARE

## 2018-10-08 VITALS
HEIGHT: 61 IN | WEIGHT: 151.31 LBS | HEART RATE: 56 BPM | BODY MASS INDEX: 28.57 KG/M2 | TEMPERATURE: 97 F | SYSTOLIC BLOOD PRESSURE: 136 MMHG | DIASTOLIC BLOOD PRESSURE: 50 MMHG

## 2018-10-08 DIAGNOSIS — I48.20 CHRONIC ATRIAL FIBRILLATION (HCC): ICD-10-CM

## 2018-10-08 DIAGNOSIS — I10 ESSENTIAL HYPERTENSION WITH GOAL BLOOD PRESSURE LESS THAN 130/85: Primary | ICD-10-CM

## 2018-10-08 PROCEDURE — G0463 HOSPITAL OUTPT CLINIC VISIT: HCPCS | Performed by: INTERNAL MEDICINE

## 2018-10-08 PROCEDURE — 99214 OFFICE O/P EST MOD 30 MIN: CPT | Performed by: INTERNAL MEDICINE

## 2018-10-08 PROCEDURE — G0008 ADMIN INFLUENZA VIRUS VAC: HCPCS | Performed by: INTERNAL MEDICINE

## 2018-10-08 PROCEDURE — 90653 IIV ADJUVANT VACCINE IM: CPT | Performed by: INTERNAL MEDICINE

## 2018-10-08 RX ORDER — AMLODIPINE BESYLATE 10 MG/1
5 TABLET ORAL DAILY
Refills: 0 | Status: ON HOLD | COMMUNITY
Start: 2018-08-21 | End: 2021-01-01

## 2018-10-08 RX ORDER — HYDROCODONE BITARTRATE AND ACETAMINOPHEN 10; 325 MG/1; MG/1
1 TABLET ORAL EVERY 4 HOURS PRN
Qty: 120 TABLET | Refills: 0 | Status: ON HOLD | OUTPATIENT
Start: 2018-11-19 | End: 2018-11-06

## 2018-10-08 RX ORDER — MOMETASONE FUROATE 1 MG/G
OINTMENT TOPICAL
Qty: 45 G | Refills: 3 | Status: ON HOLD | OUTPATIENT
Start: 2018-10-08 | End: 2019-01-03

## 2018-10-08 NOTE — PROGRESS NOTES
HPI:    Patient ID: Yudy Cesar is a 80year old female. Hypertension   This is a chronic problem. The current episode started more than 1 year ago. The problem is unchanged. The problem is controlled. Associated symptoms include peripheral edema.  Per light-headedness, numbness and headaches. Current Outpatient Medications:  vitamin A 50016 UNITS Oral Cap Take 10,000 Units by mouth daily.  Disp:  Rfl:    [START ON 11/19/2018] HYDROcodone-acetaminophen  MG Oral Tab Take 1 tablet by mouth time. She appears well-developed. HENT:   Mouth/Throat: Oropharynx is clear and moist.   Eyes: EOM are normal. Pupils are equal, round, and reactive to light. Neck: Normal range of motion. No thyromegaly present.    Cardiovascular: Normal rate, regular

## 2018-11-04 ENCOUNTER — HOSPITAL ENCOUNTER (INPATIENT)
Facility: HOSPITAL | Age: 83
LOS: 5 days | Discharge: NURSING, REHAB OR LONG-TERM CARE | DRG: 563 | End: 2018-11-09
Attending: EMERGENCY MEDICINE | Admitting: HOSPITALIST
Payer: MEDICARE

## 2018-11-04 ENCOUNTER — APPOINTMENT (OUTPATIENT)
Dept: CT IMAGING | Facility: HOSPITAL | Age: 83
DRG: 563 | End: 2018-11-04
Attending: EMERGENCY MEDICINE
Payer: MEDICARE

## 2018-11-04 ENCOUNTER — APPOINTMENT (OUTPATIENT)
Dept: GENERAL RADIOLOGY | Facility: HOSPITAL | Age: 83
DRG: 563 | End: 2018-11-04
Attending: EMERGENCY MEDICINE
Payer: MEDICARE

## 2018-11-04 DIAGNOSIS — S42.291A OTHER CLOSED DISPLACED FRACTURE OF PROXIMAL END OF RIGHT HUMERUS, INITIAL ENCOUNTER: Primary | ICD-10-CM

## 2018-11-04 PROBLEM — S42.201A CLOSED FRACTURE OF RIGHT PROXIMAL HUMERUS: Status: ACTIVE | Noted: 2018-11-04

## 2018-11-04 PROCEDURE — 99223 1ST HOSP IP/OBS HIGH 75: CPT | Performed by: HOSPITALIST

## 2018-11-04 PROCEDURE — 70450 CT HEAD/BRAIN W/O DYE: CPT | Performed by: EMERGENCY MEDICINE

## 2018-11-04 PROCEDURE — 73060 X-RAY EXAM OF HUMERUS: CPT | Performed by: EMERGENCY MEDICINE

## 2018-11-04 RX ORDER — HYDROCODONE BITARTRATE AND ACETAMINOPHEN 5; 325 MG/1; MG/1
2 TABLET ORAL EVERY 4 HOURS PRN
Status: DISCONTINUED | OUTPATIENT
Start: 2018-11-04 | End: 2018-11-09

## 2018-11-04 RX ORDER — VITS A,C,E/LUTEIN/MINERALS 300MCG-200
1 TABLET ORAL
Status: DISCONTINUED | OUTPATIENT
Start: 2018-11-05 | End: 2018-11-09

## 2018-11-04 RX ORDER — ATORVASTATIN CALCIUM 10 MG/1
10 TABLET, FILM COATED ORAL NIGHTLY
Status: DISCONTINUED | OUTPATIENT
Start: 2018-11-04 | End: 2018-11-09

## 2018-11-04 RX ORDER — LOSARTAN POTASSIUM 100 MG/1
100 TABLET ORAL NIGHTLY
Status: DISCONTINUED | OUTPATIENT
Start: 2018-11-04 | End: 2018-11-09

## 2018-11-04 RX ORDER — MORPHINE SULFATE 2 MG/ML
1 INJECTION, SOLUTION INTRAMUSCULAR; INTRAVENOUS EVERY 2 HOUR PRN
Status: DISCONTINUED | OUTPATIENT
Start: 2018-11-04 | End: 2018-11-06

## 2018-11-04 RX ORDER — MORPHINE SULFATE 2 MG/ML
2 INJECTION, SOLUTION INTRAMUSCULAR; INTRAVENOUS EVERY 2 HOUR PRN
Status: DISCONTINUED | OUTPATIENT
Start: 2018-11-04 | End: 2018-11-06

## 2018-11-04 RX ORDER — ACETAMINOPHEN 325 MG/1
650 TABLET ORAL EVERY 4 HOURS PRN
Status: DISCONTINUED | OUTPATIENT
Start: 2018-11-04 | End: 2018-11-09

## 2018-11-04 RX ORDER — MORPHINE SULFATE 4 MG/ML
4 INJECTION, SOLUTION INTRAMUSCULAR; INTRAVENOUS EVERY 2 HOUR PRN
Status: DISCONTINUED | OUTPATIENT
Start: 2018-11-04 | End: 2018-11-06

## 2018-11-04 RX ORDER — HYDROCODONE BITARTRATE AND ACETAMINOPHEN 5; 325 MG/1; MG/1
1 TABLET ORAL EVERY 4 HOURS PRN
Status: DISCONTINUED | OUTPATIENT
Start: 2018-11-04 | End: 2018-11-09

## 2018-11-04 RX ORDER — ONDANSETRON 2 MG/ML
4 INJECTION INTRAMUSCULAR; INTRAVENOUS EVERY 6 HOURS PRN
Status: DISCONTINUED | OUTPATIENT
Start: 2018-11-04 | End: 2018-11-09

## 2018-11-04 RX ORDER — SODIUM CHLORIDE 0.9 % (FLUSH) 0.9 %
3 SYRINGE (ML) INJECTION AS NEEDED
Status: DISCONTINUED | OUTPATIENT
Start: 2018-11-04 | End: 2018-11-09

## 2018-11-04 RX ORDER — IPRATROPIUM BROMIDE AND ALBUTEROL SULFATE 2.5; .5 MG/3ML; MG/3ML
3 SOLUTION RESPIRATORY (INHALATION) EVERY 4 HOURS PRN
Status: DISCONTINUED | OUTPATIENT
Start: 2018-11-04 | End: 2018-11-09

## 2018-11-04 RX ORDER — MORPHINE SULFATE 4 MG/ML
2 INJECTION, SOLUTION INTRAMUSCULAR; INTRAVENOUS ONCE
Status: COMPLETED | OUTPATIENT
Start: 2018-11-04 | End: 2018-11-04

## 2018-11-04 RX ORDER — ISOSORBIDE MONONITRATE 60 MG/1
60 TABLET, EXTENDED RELEASE ORAL DAILY
Status: DISCONTINUED | OUTPATIENT
Start: 2018-11-05 | End: 2018-11-09

## 2018-11-04 RX ORDER — HYDROCHLOROTHIAZIDE 25 MG/1
25 TABLET ORAL DAILY
Status: DISCONTINUED | OUTPATIENT
Start: 2018-11-05 | End: 2018-11-09

## 2018-11-04 RX ORDER — DOCUSATE SODIUM 100 MG/1
100 CAPSULE, LIQUID FILLED ORAL 2 TIMES DAILY
Status: DISCONTINUED | OUTPATIENT
Start: 2018-11-04 | End: 2018-11-09

## 2018-11-04 RX ORDER — MORPHINE SULFATE 4 MG/ML
4 INJECTION, SOLUTION INTRAMUSCULAR; INTRAVENOUS ONCE
Status: COMPLETED | OUTPATIENT
Start: 2018-11-04 | End: 2018-11-04

## 2018-11-04 RX ORDER — FLUTICASONE PROPIONATE 50 MCG
2 SPRAY, SUSPENSION (ML) NASAL DAILY
Status: DISCONTINUED | OUTPATIENT
Start: 2018-11-05 | End: 2018-11-09

## 2018-11-04 RX ORDER — PANTOPRAZOLE SODIUM 40 MG/1
40 TABLET, DELAYED RELEASE ORAL
Status: DISCONTINUED | OUTPATIENT
Start: 2018-11-05 | End: 2018-11-09

## 2018-11-04 RX ORDER — BISACODYL 10 MG
10 SUPPOSITORY, RECTAL RECTAL
Status: DISCONTINUED | OUTPATIENT
Start: 2018-11-04 | End: 2018-11-09

## 2018-11-04 RX ORDER — METOCLOPRAMIDE HYDROCHLORIDE 5 MG/ML
10 INJECTION INTRAMUSCULAR; INTRAVENOUS EVERY 8 HOURS PRN
Status: DISCONTINUED | OUTPATIENT
Start: 2018-11-04 | End: 2018-11-05

## 2018-11-04 RX ORDER — ALPRAZOLAM 0.5 MG/1
0.25 TABLET ORAL NIGHTLY PRN
Status: DISCONTINUED | OUTPATIENT
Start: 2018-11-04 | End: 2018-11-09

## 2018-11-04 RX ORDER — POLYETHYLENE GLYCOL 3350 17 G/17G
17 POWDER, FOR SOLUTION ORAL DAILY PRN
Status: DISCONTINUED | OUTPATIENT
Start: 2018-11-04 | End: 2018-11-09

## 2018-11-04 RX ORDER — AMLODIPINE BESYLATE 10 MG/1
10 TABLET ORAL
Status: DISCONTINUED | OUTPATIENT
Start: 2018-11-05 | End: 2018-11-09

## 2018-11-04 NOTE — ED NOTES
Pt presents today for fall. Pt was in a restroom when she slipped, landing on her tailbone. When trying to get up, pt put weight on her right arm. Pt presents today with pain in her right humerus. Pt denies pain in her tailbone. Pt takes Eliquis.  No head i

## 2018-11-04 NOTE — ED INITIAL ASSESSMENT (HPI)
Pt presents to Ed with c/o  Right arm s/p fall mechanical fall denies any head injury or loc Onset 2 pm  today

## 2018-11-05 PROCEDURE — 99233 SBSQ HOSP IP/OBS HIGH 50: CPT | Performed by: HOSPITALIST

## 2018-11-05 RX ORDER — METOCLOPRAMIDE HYDROCHLORIDE 5 MG/ML
5 INJECTION INTRAMUSCULAR; INTRAVENOUS EVERY 8 HOURS PRN
Status: DISCONTINUED | OUTPATIENT
Start: 2018-11-05 | End: 2018-11-09

## 2018-11-05 RX ORDER — POTASSIUM CHLORIDE 20 MEQ/1
40 TABLET, EXTENDED RELEASE ORAL ONCE
Status: COMPLETED | OUTPATIENT
Start: 2018-11-05 | End: 2018-11-05

## 2018-11-05 NOTE — PROGRESS NOTES
[facility] Pharm[facility] Note:  Renal Dose Adjustment for Metoclopramide (REGLAN)    Giacomo Camarillo has been prescribed Metoclopramide (REGLAN) 10 mg every 8 hours as needed for nausea,vomiting.     Estimated Creatinine Clearance: 26.6 mL/min (based on SCr of 0.98 mg/dL

## 2018-11-05 NOTE — OCCUPATIONAL THERAPY NOTE
OCCUPATIONAL THERAPY EVALUATION - INPATIENT     Room Number: 527/527-A  Evaluation Date: 11/5/2018  Type of Evaluation: Initial  Presenting Problem: (fx of R displaced humerus)    Physician Order: IP Consult to Occupational Therapy  Reason for Therapy: ADL fit. Pt and daughter inquire about rehab process and who decides when she goes home, therapist provide education on rehab process, encouraged to sit up in chair for 1-2 hrs if she can tolerate and to call RN when ready to tfr back to bed.  Ice pack provided Surgical History  Past Surgical History:   Procedure Laterality Date   • DRAIN/INJECT LARGE JOINT/BURSA     • HEMORRHOIDECTOMY     • HYSTERECTOMY         HOME SITUATION  Type of Home: House  Home Layout: Two level  Lives With: Alone(daughter lives 1 mile a toilet, bedpan or urinal? : Total  -   Putting on and taking off regular upper body clothing?: A Lot  -   Taking care of personal grooming such as brushing teeth?: A Little  -   Eating meals?: A Little    AM-PAC Score:  Score: 13  Approx Degree of Impairme

## 2018-11-05 NOTE — PLAN OF CARE
Problem: Patient/Family Goals  Goal: Patient/Family Long Term Goal  Patient's Long Term Goal: \"I want to go home\"     Interventions:  - monitor pain and administer medications for pain control, follow MD orders as prescribed, monitor vitals and labs   -

## 2018-11-05 NOTE — PROGRESS NOTES
[facility]    Progress Note    Molly Baezak Patient Status:  Inpatient    1922 MRN [id number]   Location [facility] 5SW/SE Attending Natacha Nguyen MD   Hosp Day # 1 PCP Ashly Hernández MD       Subjective:     Pain in right Chronic small vessel ischemic changes. 6. Atherosclerosis. Dictated by (CST): Reymundo Radford MD on 11/04/2018 at 19:44     Approved by (CST):  Reymundo Radford MD on 11/04/2018 at 19:47                Assessment and Plan:     Closed fracture of righ

## 2018-11-05 NOTE — ED NOTES
Pt updated on plan of care and radiology results. Ice chips given. RN to update pt's tetanus per pt request. Sling applied by ERT. Pt resting with family at bedside.

## 2018-11-05 NOTE — H&P
[facility] Patient Status:  Emergency    1922 MRN [id number]   Location [address] Attending Kenneth Finch MD   Hosp Day # 0 PCP Blade Kirkland MD     Date:  6 file    Allergies/Medications: Allergies: No Known Allergies    Home medications    Please see med rec list    Review of Systems:     + right arm pain  No CP  No SOB  No f/c  No n/v/c/d    The remainder of the ROS is negative except as noted in the HPI. ischemic changes. 6. Atherosclerosis. Dictated by (CST): Demarcus Garcia MD on 11/04/2018 at 19:44     Approved by (CST):  Demarcus Garcia MD on 11/04/2018 at 19:47                Assessment/Plan:       Closed fracture of right proximal humerus  - o

## 2018-11-05 NOTE — ED NOTES
Orders for admission, patient is aware of plan and ready to go upstairs. Any questions, please call ED PHILLIP Montalvo  at extension [phone].

## 2018-11-05 NOTE — PLAN OF CARE
Problem: Patient Centered Care  Goal: Patient preferences are identified and integrated in the patient's plan of care  Interventions:  - What would you like us to know as we care for you?  \"My daughter is very involved with my care\"   - Provide timely, co cardiac monitoring, monitor vital signs, obtain 12 lead EKG if indicated  - Evaluate effectiveness of antiarrhythmic and heart rate control medications as ordered  - Initiate emergency measures for life threatening arrhythmias  - Monitor electrolytes and a

## 2018-11-05 NOTE — ED PROVIDER NOTES
Patient Seen in: [facility] [place] AND [facility] Emergency Department    History   Patient presents with:  Fall (musculoskeletal, neurologic)    Stated Complaint: slip and fall in bathroom, right arm pain    HPI    80year-old female presents for evaluation after fall 17   Ht 157.5 cm (5' 2\")   Wt 70.3 kg   SpO2 96%   BMI 28.35 kg/m²         Physical Exam   Constitutional: She is oriented to person, place, and time. She appears well-developed and well-nourished. No distress.    HENT:   Head: Normocephalic and atraumatic limits   CBC WITH DIFFERENTIAL WITH PLATELET    Narrative: The following orders were created for panel order CBC WITH DIFFERENTIAL WITH PLATELET.   Procedure                               Abnormality         Status                     --------- fracture of proximal end of right humerus, initial encounter  (primary encounter diagnosis)    Disposition:  There is no disposition on file for this visit. There is no disposition time on file for this visit.     Follow-up:  No follow-up provider specifie

## 2018-11-05 NOTE — CONSULTS
Pt seen and examined. Full consult dictated. Impression:  Right proximal humerus fracture, mildy displaced with glenohumeral OA. Recommend sling immobilization. No surgery planned. NWB RUE. Will need subacute rehab placement.

## 2018-11-05 NOTE — CM/SW NOTE
SW met w/ pt and pt's dtr to discuss discharge planning. Pt lives at home alone. Pt reported there are 6 steps to use to get into the home and 14 steps up to her bedroom & bathroom. Pt reported she has a chair lift inside and outside, along w/ a walker.  Pt

## 2018-11-05 NOTE — PHYSICAL THERAPY NOTE
PHYSICAL THERAPY EVALUATION - INPATIENT     Room Number: 527/527-A  Evaluation Date: 11/5/2018  Type of Evaluation: Initial   Physician Order: PT Eval and Treat    Presenting Problem: Closed displaced fracture of proximal end of Right humerus  Reason for outcomes. Patient and family agreeable. Patient will benefit from continued IP PT services to address these deficits in preparation for discharge.     DISCHARGE RECOMMENDATIONS  PT Discharge Recommendations: Sub-acute rehabilitation    PLAN  PT Treatment activities- dtr lives a mile away and assists daily with laundry, meals and driving. SUBJECTIVE  \"This is frustrating! \"    PHYSICAL THERAPY EXAMINATION     OBJECTIVE  Precautions: Limb alert - right;Bed/chair alarm(Sling RUE)  Fall Risk: High fall ri Scale): 38.1   CMS Modifier (G-Code): CL    FUNCTIONAL ABILITY STATUS  Gait Assessment   Gait Assistance:  Moderate assistance  Distance (ft): 6ft  Assistive Device: Other (Comment)(alesia-walker (LEFT))  Pattern: Shuffle  Stoop/Curb Assistance: Not tested

## 2018-11-06 PROCEDURE — 99233 SBSQ HOSP IP/OBS HIGH 50: CPT | Performed by: HOSPITALIST

## 2018-11-06 RX ORDER — HYDROCODONE BITARTRATE AND ACETAMINOPHEN 5; 325 MG/1; MG/1
1-2 TABLET ORAL EVERY 4 HOURS PRN
Qty: 30 TABLET | Refills: 0 | Status: ON HOLD | OUTPATIENT
Start: 2018-11-06 | End: 2019-01-03

## 2018-11-06 RX ORDER — ALPRAZOLAM 0.25 MG/1
0.25 TABLET ORAL NIGHTLY PRN
Qty: 30 TABLET | Refills: 0 | Status: ON HOLD | OUTPATIENT
Start: 2018-11-06 | End: 2020-03-23

## 2018-11-06 RX ORDER — POTASSIUM CHLORIDE 20 MEQ/1
40 TABLET, EXTENDED RELEASE ORAL ONCE
Status: COMPLETED | OUTPATIENT
Start: 2018-11-06 | End: 2018-11-06

## 2018-11-06 NOTE — CONSULTS
[facility]    PATIENT'S NAME: Era Chase   ATTENDING PHYSICIAN: Licha Shoemaker MD   CONSULTING PHYSICIAN: Monty Castro MD   PATIENT ACCOUNT#:   [id number]    LOCATION:  [address] [place] [address] Loop #:   [id number]       DATE OF BIRTH: involvement of the greater tuberosity. She has severe degenerative change of glenohumeral joint as well. No significant displacement of the humeral head with respect to the glenoid.     ASSESSMENT AND PLAN:  The patient has a proximal humerus fracture in

## 2018-11-06 NOTE — OCCUPATIONAL THERAPY NOTE
OCCUPATIONAL THERAPY TREATMENT NOTE - INPATIENT        Room Number: 527/527-A           Presenting Problem: (fx of R displaced humerus)    Problem List  Principal Problem:    Other closed displaced fracture of proximal end of right humerus, initial encount Extremity: Non-Weight Bearing             PAIN ASSESSMENT  Rating: (does not quantify)  Location: (R shoulder)  Management Techniques: Relaxation;Repositioning        ACTIVITIES OF DAILY LIVING ASSESSMENT  AM-PAC ‘6-Clicks’ Inpatient Daily Activity Short F

## 2018-11-06 NOTE — PHYSICAL THERAPY NOTE
PHYSICAL THERAPY TREATMENT NOTE - INPATIENT     Room Number: 527/527-A       Presenting Problem: Closed displaced fracture of proximal end of Right humerus    Problem List  Principal Problem:    Other closed displaced fracture of proximal end of right hume RESTRICTION  Weight Bearing Restriction: R upper extremity  R Upper Extremity: Non-Weight Bearing             PAIN ASSESSMENT   Ratin  Location: RUE  Management Techniques:  Body mechanics;Repositioning    BALANCE addressed;RN aware of session/findings;Call light within reach; Needs met    CURRENT GOALS   Goals to be met by: 11/19/18  Patient Goal Patient's self-stated goal is: \"return home eventually\"   Goal #1 Patient is able to demonstrate supine - sit EOB @ lev

## 2018-11-06 NOTE — PROGRESS NOTES
[facility]    Progress Note    Molly Méndez Patient Status:  Inpatient    1922 MRN [id number]   Location [facility] 5SW/SE Attending Natacha Nguyen, [address] [facility] Day # 2 PCP Ashly Hernández MD        Subjective:     Constituti (L) 11/06/2018     11/06/2018    CREATSERUM 0.93 11/06/2018    BUN 34 (H) 11/06/2018     11/06/2018    K 3.8 11/06/2018    K 3.8 11/06/2018     11/06/2018    CO2 23 11/06/2018    GLU 93 11/06/2018    CA 8.9 11/06/2018    ALB 4.2 06/25/20

## 2018-11-07 PROCEDURE — 99233 SBSQ HOSP IP/OBS HIGH 50: CPT | Performed by: HOSPITALIST

## 2018-11-07 NOTE — PHYSICAL THERAPY NOTE
PHYSICAL THERAPY TREATMENT NOTE - INPATIENT     Room Number: 527/527-A       Presenting Problem: Closed displaced fracture of proximal end of Right humerus    Problem List  Principal Problem:    Other closed displaced fracture of proximal end of right hume Restriction: R upper extremity  R Upper Extremity: Non-Weight Bearing             PAIN ASSESSMENT   Ratin  Location: RUE  Management Techniques: Activity promotion; Body mechanics    BALANCE able to demonstrate supine - sit EOB @ level: supervision     Goal #1   Current Status Min A   Goal #2 Patient is able to demonstrate transfers Sit to/from Stand at assistance level: supervision with alesia-walker     Goal #2  Current Status Min A   Goal #3

## 2018-11-07 NOTE — PAYOR COMM NOTE
--------------  ADMISSION REVIEW     Payor: Roz Bishop  Subscriber #:  Basia Pulliam  Authorization Number: [id number]    Admit date: 11/4/18  Admit time: 2135       Admitting Physician: Greg Hermosillo MD  Attending Physician:  Greg Hermosillo, [facility] Social History    Tobacco Use      Smoking status: Never Smoker      Smokeless tobacco: Never Used    Alcohol use: No    Drug use: Not on file      Review of Systems    Positive for stated complaint: slip and fall in bathroom, right arm pain  Oth components:       Result Value    Glucose 153 (*)     BUN 40 (*)     BUN/CREA Ratio 37.4 (*)     Calculated Osmolality 297 (*)     GFR, Non- 44 (*)     GFR, -American 51 (*)     All other components within normal limits   PROTHROMBIN Guera Granados MD on 11/04/2018 at 19:47            Radiology exams  Viewed and reviewed by myself and findings discussed with patient including need for follow up        MDM   Patient with proximal humerus fracture, normal CT head.   Patient lives alone humerus fx    HPI:   Yudith Pavon is a(n) 80year old female with a history of atrial fibrillation, HTN, and CHF who presents to ER this evening after a fall with right arm pain. Pt was at a bar, not drinking etoh, and had a fall in the bathroom.   Pt fel SpO2 96 %, not currently breastfeeding. Gen:   NAD. A and O x 3  Eyes:  PERRL  Moist mucus membranes. CV:    RRR.   No m/g/r  Pulm:   CTA bilat  Abd:   +bs, soft, NT, ND  LE:   No c/c/e  Right arm with swelling and tender to deep palpation  Neuro: HTN  - cont norvasc, irbesartan, hctz, imdur    Hx of HL - cont lipitor    dvt proph - eliquis    Code status - Full    Quita Arroyo MD  11/4/2018     Electronically signed by Shankar ePrez MD on 11/4/2018  8:06 PM         MEDICATIONS ADMINISTERED IN Vee Collado RN      Potassium Chloride ER (K-DUR M20) CR tab 40 mEq     Date Action Dose Route User    11/6/2018 1706 Given 40 mEq Oral Isela Moise RN        SW met w/ pt and pt's dtr to discuss discharge planning. Pt lives at home alone.  Pt neck proximal humerus fracture with some involvement of the greater tuberosity. She has severe degenerative change of glenohumeral joint as well.   No significant displacement of the humeral head with respect to the glenoid.     ASSESSMENT AND PLAN:  The p prn  - PT/OT/SW consults  - cont home eliquis  - follow up with ortho in 2 weeks      Hx of atrial fibrillation  - cont eliquis  - not on a rate control drug     Hx of HTN  - cont norvasc, irbesartan, hctz, imdur     Hx of HL - cont lipitor     dvt proph -

## 2018-11-07 NOTE — PROGRESS NOTES
[facility]    Progress Note    Lorrisenait Malgorzata Patient Status:  Inpatient    1922 MRN [id number]   Location [facility] 5SW/SE Attending Bianca Siegel, [facility] Day # 3 PCP Genevieve Harper MD        Subjective:     Constituti Date    WBC 6.3 11/06/2018    HGB 8.9 (L) 11/06/2018    HCT 26.6 (L) 11/06/2018     11/06/2018    CREATSERUM 0.93 11/06/2018    BUN 34 (H) 11/06/2018     11/06/2018    K 4.1 11/07/2018     11/06/2018    CO2 23 11/06/2018    GLU 93 11/06/

## 2018-11-07 NOTE — CM/SW NOTE
Addend 0002p:     Aurora Kumar from [employer] informed SW that direct contact number for MD will be needed in order for Dimitrios SILVA to call for peer to peer. SW spoke w/ Dr. Thania Olivares w/ update and stated that [facility] MD will call regarding peer to peer.      Case reference num

## 2018-11-08 ENCOUNTER — TELEPHONE (OUTPATIENT)
Dept: FAMILY MEDICINE CLINIC | Facility: CLINIC | Age: 83
End: 2018-11-08

## 2018-11-08 PROCEDURE — 99233 SBSQ HOSP IP/OBS HIGH 50: CPT | Performed by: HOSPITALIST

## 2018-11-08 NOTE — OCCUPATIONAL THERAPY NOTE
OCCUPATIONAL THERAPY TREATMENT NOTE - INPATIENT    Room Number: 527/527-A         Presenting Problem: (fx of R displaced humerus)     Problem List  Principal Problem:    Other closed displaced fracture of proximal end of right humerus, initial encounter  A Form  How much help from another person does the patient currently need…  -   Putting on and taking off regular lower body clothing?: A Lot  -   Bathing (including washing, rinsing, drying)?: A Lot  -   Toileting, which includes using toilet, bedpan or uri session.             Goals  on: 18  Frequency: 3-5/ week

## 2018-11-08 NOTE — TELEPHONE ENCOUNTER
The real story is that the hospital has been trying to get approval from [employer] , they are NOT authorizing the rehab stay , and the physician at [facility] has requested a peer to peer review with the medical director of the insurance company and to date they haven't answered our physician .

## 2018-11-08 NOTE — PROGRESS NOTES
[facility]  Progress Note     Turner Button  : 1922    Status: Inpatient  Day #: 4    Attending: Ana Rankin MD  PCP: Patricia Fuentes MD      Assessment and Plan     Closed fracture of right proximal humerus  - ortho consult, recommends 11/06/18   0606  11/07/18   0531   BUN  40*  37*  34*   --    CREATSERUM  1.07  0.98  0.93   --    GFRAA  51*  56*  60   --    GFRNAA  44*  49*  52*   --    CA  8.9  8.5  8.9   --    NA  137  139  138   --    K  4.0  3.8  3.8  3.8  4.1   CL  105  108  106

## 2018-11-08 NOTE — OCCUPATIONAL THERAPY NOTE
Pt not seen for OT at this time secondary to family refusing, stating that she will do nothing until she eats. Will attempt to see pt later in date as schedule permits.

## 2018-11-08 NOTE — DISCHARGE SUMMARY
[facility]  Discharge Summary     Norberto Hammond  : 1922    Status: Inpatient  Day #: 5    Attending: Anne Marie Pena MD  PCP: Heraclio Robles MD     Date of Admission: 2018  Date of Discharge: 2018     Hospital Discharge Diagnoses START taking these medications      Instructions Prescription details   HYDROcodone-acetaminophen 5-325 MG Tabs  Commonly known as:  NORCO  Replaces:  HYDROcodone-acetaminophen  MG Tabs      Take 1-2 tablets by mouth every 4 (four) hours as needed. needed.    Quantity:  1 kit  Refills:  1     Pantoprazole Sodium 40 MG Tbec  Commonly known as:  PROTONIX      TAKE 1 TABLET BY MOUTH EVERY MORNING BEFORE BREAKFAST   Quantity:  90 tablet  Refills:  3     vitamin A 55679 UNITS Caps  Commonly known as:  AQUA

## 2018-11-08 NOTE — CM/SW NOTE
Addend 426p:     Dr. Sigifredo West informed VIRGINIE that Ankit Corey MD to approve pt's rehab. VIRGINIE updated Carmelita from [facility] who stated they have not received insurance authorization and will contact Critical access hospital.  VIRGINIE attempted to call Critical access hospital for authorization #, in which the

## 2018-11-08 NOTE — TELEPHONE ENCOUNTER
I knew she was admitted and saw that the hospital did actually put in the orders for rehab, and  sent the necessary paperwork to the [place]. That was as of Tues. I will re evaluate notes again .

## 2018-11-08 NOTE — PHYSICAL THERAPY NOTE
PHYSICAL THERAPY TREATMENT NOTE - INPATIENT     Room Number: 527/527-A       Presenting Problem: Closed displaced fracture of proximal end of Right humerus    Problem List  Principal Problem:    Other closed displaced fracture of proximal end of right hume training    SUBJECTIVE  \" My left shoulder hurts less today\".     OBJECTIVE  Precautions: Limb alert - right    WEIGHT BEARING RESTRICTION  Weight Bearing Restriction: R upper extremity  R Upper Extremity: Non-Weight Bearing             PAIN ASSESSMENT session/findings;Call light within reach; Needs met    CURRENT GOALS   Goals to be met by: 11/19/18  Patient Goal Patient's self-stated goal is: \"return home eventually\"   Goal #1 Patient is able to demonstrate supine - sit EOB @ level: supervision     Go

## 2018-11-08 NOTE — TELEPHONE ENCOUNTER
Cong Dasilva    Re: Saniya DANIELS: Just to let you know she fell down in bathroom and broke her rt arm, she is trying to get her into Rehab, But hospital is refusing, the emergency room  Is pushing for her to do Rehab.   Wanted to know if their was anything you can do ?????

## 2018-11-08 NOTE — PLAN OF CARE
Problem: Patient Centered Care  Goal: Patient preferences are identified and integrated in the patient's plan of care  Interventions:  - What would you like us to know as we care for you?  \"My daughter is very involved with my care\"   - Provide timely, co Continuous cardiac monitoring, monitor vital signs, obtain 12 lead EKG if indicated  - Evaluate effectiveness of antiarrhythmic and heart rate control medications as ordered  - Initiate emergency measures for life threatening arrhythmias  - Monitor electro

## 2018-11-09 VITALS
WEIGHT: 153.31 LBS | TEMPERATURE: 97 F | SYSTOLIC BLOOD PRESSURE: 141 MMHG | OXYGEN SATURATION: 96 % | DIASTOLIC BLOOD PRESSURE: 56 MMHG | HEART RATE: 68 BPM | HEIGHT: 62 IN | RESPIRATION RATE: 16 BRPM | BODY MASS INDEX: 28.21 KG/M2

## 2018-11-09 PROCEDURE — 99239 HOSP IP/OBS DSCHRG MGMT >30: CPT | Performed by: HOSPITALIST

## 2018-11-09 NOTE — PLAN OF CARE
D. Patient is accepted at Replaced by [facility] with insurance authorization. Krys Callahan,  spoke to [facility] to confirm in RN's presence and requested transport time of 1130. A. Explained the above to patient and daughter, Ashly Hdz.  They would like pat

## 2018-11-09 NOTE — PLAN OF CARE
Pt a/ox4. Pt given norco 2 tabs po for c/o pain to rt arm fracture. Rt arm in sling, mepilex dressing changed. Pt assisted to bathroom. Pt asleep presently. Safety precautions in place.

## 2018-11-09 NOTE — CM/SW NOTE
VIRGINIE spoke w/ Jeferson Morgan from [facility] [phone] who stated that pt's rehab has been approved, good through 11/13; in which pt would need to discharge from hospital by 11/13 or re-submission for insurance authorization would needed.      VIRGINIE inquired about author

## 2018-11-09 NOTE — TELEPHONE ENCOUNTER
I called Zeen[employer] Bain, they have to have a peer to peer by 4:30pm Friday, is the last she has up to date.

## 2018-11-26 RX ORDER — ATORVASTATIN CALCIUM 10 MG/1
TABLET, FILM COATED ORAL
Qty: 90 TABLET | Refills: 2 | Status: SHIPPED | OUTPATIENT
Start: 2018-11-26 | End: 2020-01-27

## 2018-11-26 RX ORDER — ISOSORBIDE MONONITRATE 60 MG/1
TABLET, EXTENDED RELEASE ORAL
Qty: 90 TABLET | Refills: 2 | Status: ON HOLD | OUTPATIENT
Start: 2018-11-26 | End: 2021-01-01

## 2019-01-02 ENCOUNTER — APPOINTMENT (OUTPATIENT)
Dept: GENERAL RADIOLOGY | Facility: HOSPITAL | Age: 84
DRG: 542 | End: 2019-01-02
Attending: EMERGENCY MEDICINE
Payer: MEDICARE

## 2019-01-02 ENCOUNTER — HOSPITAL ENCOUNTER (INPATIENT)
Facility: HOSPITAL | Age: 84
LOS: 5 days | Discharge: NURSING, REHAB OR LONG-TERM CARE | DRG: 542 | End: 2019-01-08
Attending: EMERGENCY MEDICINE | Admitting: INTERNAL MEDICINE
Payer: MEDICARE

## 2019-01-02 DIAGNOSIS — M54.9 INTRACTABLE BACK PAIN: Primary | ICD-10-CM

## 2019-01-02 DIAGNOSIS — I10 ESSENTIAL HYPERTENSION: ICD-10-CM

## 2019-01-02 LAB
ANION GAP SERPL CALC-SCNC: 11 MMOL/L (ref 0–18)
BASOPHILS # BLD: 0 K/UL (ref 0–0.2)
BASOPHILS NFR BLD: 0 %
BILIRUB UR QL: NEGATIVE
BUN SERPL-MCNC: 26 MG/DL (ref 8–20)
BUN/CREAT SERPL: 23.2 (ref 10–20)
CALCIUM SERPL-MCNC: 8.6 MG/DL (ref 8.5–10.5)
CHLORIDE SERPL-SCNC: 98 MMOL/L (ref 95–110)
CLARITY UR: CLEAR
CO2 SERPL-SCNC: 23 MMOL/L (ref 22–32)
COLOR UR: YELLOW
CREAT SERPL-MCNC: 1.12 MG/DL (ref 0.5–1.5)
EOSINOPHIL # BLD: 0.2 K/UL (ref 0–0.7)
EOSINOPHIL NFR BLD: 2 %
ERYTHROCYTE [DISTWIDTH] IN BLOOD BY AUTOMATED COUNT: 16 % (ref 11–15)
GLUCOSE SERPL-MCNC: 123 MG/DL (ref 70–99)
GLUCOSE UR-MCNC: NEGATIVE MG/DL
HCT VFR BLD AUTO: 29.5 % (ref 35–48)
HGB BLD-MCNC: 9.8 G/DL (ref 12–16)
HGB UR QL STRIP.AUTO: NEGATIVE
KETONES UR-MCNC: NEGATIVE MG/DL
LYMPHOCYTES # BLD: 0.6 K/UL (ref 1–4)
LYMPHOCYTES NFR BLD: 8 %
MCH RBC QN AUTO: 30.8 PG (ref 27–32)
MCHC RBC AUTO-ENTMCNC: 33.3 G/DL (ref 32–37)
MCV RBC AUTO: 92.5 FL (ref 80–100)
MONOCYTES # BLD: 0.6 K/UL (ref 0–1)
MONOCYTES NFR BLD: 7 %
NEUTROPHILS # BLD AUTO: 6.5 K/UL (ref 1.8–7.7)
NEUTROPHILS NFR BLD: 82 %
NITRITE UR QL STRIP.AUTO: NEGATIVE
OSMOLALITY UR CALC.SUM OF ELEC: 280 MOSM/KG (ref 275–295)
PH UR: 5 [PH] (ref 5–8)
PLATELET # BLD AUTO: 216 K/UL (ref 140–400)
PMV BLD AUTO: 7.7 FL (ref 7.4–10.3)
POTASSIUM SERPL-SCNC: 4.1 MMOL/L (ref 3.3–5.1)
PROT UR-MCNC: NEGATIVE MG/DL
RBC # BLD AUTO: 3.19 M/UL (ref 3.7–5.4)
RBC #/AREA URNS AUTO: 1 /HPF
SODIUM SERPL-SCNC: 132 MMOL/L (ref 136–144)
SP GR UR STRIP: 1.01 (ref 1–1.03)
UROBILINOGEN UR STRIP-ACNC: <2
VIT C UR-MCNC: 40 MG/DL
WBC # BLD AUTO: 7.9 K/UL (ref 4–11)
WBC #/AREA URNS AUTO: 3 /HPF

## 2019-01-02 PROCEDURE — 72072 X-RAY EXAM THORAC SPINE 3VWS: CPT | Performed by: EMERGENCY MEDICINE

## 2019-01-02 RX ORDER — MORPHINE SULFATE 4 MG/ML
4 INJECTION, SOLUTION INTRAMUSCULAR; INTRAVENOUS ONCE
Status: DISCONTINUED | OUTPATIENT
Start: 2019-01-02 | End: 2019-01-02

## 2019-01-02 RX ORDER — HYDROCODONE BITARTRATE AND ACETAMINOPHEN 5; 325 MG/1; MG/1
1 TABLET ORAL ONCE
Status: COMPLETED | OUTPATIENT
Start: 2019-01-02 | End: 2019-01-02

## 2019-01-03 ENCOUNTER — APPOINTMENT (OUTPATIENT)
Dept: MRI IMAGING | Facility: HOSPITAL | Age: 84
DRG: 542 | End: 2019-01-03
Attending: HOSPITALIST
Payer: MEDICARE

## 2019-01-03 ENCOUNTER — APPOINTMENT (OUTPATIENT)
Dept: CV DIAGNOSTICS | Facility: HOSPITAL | Age: 84
DRG: 542 | End: 2019-01-03
Attending: HOSPITALIST
Payer: MEDICARE

## 2019-01-03 ENCOUNTER — APPOINTMENT (OUTPATIENT)
Dept: GENERAL RADIOLOGY | Facility: HOSPITAL | Age: 84
DRG: 542 | End: 2019-01-03
Attending: HOSPITALIST
Payer: MEDICARE

## 2019-01-03 PROBLEM — M54.9 INTRACTABLE BACK PAIN: Status: ACTIVE | Noted: 2019-01-03

## 2019-01-03 PROCEDURE — 99219 INITIAL OBSERVATION CARE,LEVL II: CPT | Performed by: HOSPITALIST

## 2019-01-03 PROCEDURE — 72146 MRI CHEST SPINE W/O DYE: CPT | Performed by: HOSPITALIST

## 2019-01-03 PROCEDURE — 71045 X-RAY EXAM CHEST 1 VIEW: CPT | Performed by: HOSPITALIST

## 2019-01-03 PROCEDURE — 93306 TTE W/DOPPLER COMPLETE: CPT | Performed by: HOSPITALIST

## 2019-01-03 RX ORDER — PHENOL 1.4 %
10 AEROSOL, SPRAY (ML) MUCOUS MEMBRANE NIGHTLY
Status: ON HOLD | COMMUNITY
End: 2021-01-01

## 2019-01-03 RX ORDER — ISOSORBIDE MONONITRATE 30 MG/1
60 TABLET, EXTENDED RELEASE ORAL
Status: DISCONTINUED | OUTPATIENT
Start: 2019-01-03 | End: 2019-01-08

## 2019-01-03 RX ORDER — POLYETHYLENE GLYCOL 3350 17 G/17G
17 POWDER, FOR SOLUTION ORAL
Status: ON HOLD | COMMUNITY
End: 2021-01-01

## 2019-01-03 RX ORDER — ALPRAZOLAM 0.25 MG/1
0.25 TABLET ORAL
Status: COMPLETED | OUTPATIENT
Start: 2019-01-03 | End: 2019-01-03

## 2019-01-03 RX ORDER — LOSARTAN POTASSIUM 100 MG/1
100 TABLET ORAL DAILY
Status: DISCONTINUED | OUTPATIENT
Start: 2019-01-03 | End: 2019-01-08

## 2019-01-03 RX ORDER — CYCLOBENZAPRINE HCL 5 MG
5 TABLET ORAL 3 TIMES DAILY PRN
Status: DISCONTINUED | OUTPATIENT
Start: 2019-01-03 | End: 2019-01-08

## 2019-01-03 RX ORDER — VITS A,C,E/LUTEIN/MINERALS 300MCG-200
1 TABLET ORAL
Status: DISCONTINUED | OUTPATIENT
Start: 2019-01-03 | End: 2019-01-08

## 2019-01-03 RX ORDER — SENNA AND DOCUSATE SODIUM 50; 8.6 MG/1; MG/1
1 TABLET, FILM COATED ORAL DAILY
Status: DISCONTINUED | OUTPATIENT
Start: 2019-01-03 | End: 2019-01-08

## 2019-01-03 RX ORDER — HYDROMORPHONE HYDROCHLORIDE 1 MG/ML
0.5 INJECTION, SOLUTION INTRAMUSCULAR; INTRAVENOUS; SUBCUTANEOUS ONCE
Status: COMPLETED | OUTPATIENT
Start: 2019-01-03 | End: 2019-01-03

## 2019-01-03 RX ORDER — FUROSEMIDE 10 MG/ML
20 INJECTION INTRAMUSCULAR; INTRAVENOUS ONCE
Status: COMPLETED | OUTPATIENT
Start: 2019-01-03 | End: 2019-01-03

## 2019-01-03 RX ORDER — ERGOCALCIFEROL 1.25 MG/1
50000 CAPSULE ORAL
Status: DISCONTINUED | OUTPATIENT
Start: 2019-01-06 | End: 2019-01-08

## 2019-01-03 RX ORDER — HYDROCODONE BITARTRATE AND ACETAMINOPHEN 7.5; 325 MG/1; MG/1
1 TABLET ORAL EVERY 4 HOURS PRN
Status: DISCONTINUED | OUTPATIENT
Start: 2019-01-03 | End: 2019-01-08

## 2019-01-03 RX ORDER — ALPRAZOLAM 0.25 MG/1
0.25 TABLET ORAL
Status: DISCONTINUED | OUTPATIENT
Start: 2019-01-04 | End: 2019-01-03

## 2019-01-03 RX ORDER — SODIUM CHLORIDE 0.9 % (FLUSH) 0.9 %
3 SYRINGE (ML) INJECTION AS NEEDED
Status: DISCONTINUED | OUTPATIENT
Start: 2019-01-03 | End: 2019-01-08

## 2019-01-03 RX ORDER — METHYLPREDNISOLONE SODIUM SUCCINATE 40 MG/ML
40 INJECTION, POWDER, LYOPHILIZED, FOR SOLUTION INTRAMUSCULAR; INTRAVENOUS EVERY 8 HOURS
Status: DISCONTINUED | OUTPATIENT
Start: 2019-01-03 | End: 2019-01-03

## 2019-01-03 RX ORDER — HYDROCODONE BITARTRATE AND ACETAMINOPHEN 7.5; 325 MG/1; MG/1
1 TABLET ORAL EVERY 4 HOURS PRN
Status: ON HOLD | COMMUNITY
End: 2021-01-01

## 2019-01-03 RX ORDER — MULTIVITAMIN,THERAPEUTIC
1 TABLET ORAL DAILY
Status: ON HOLD | COMMUNITY
End: 2021-01-01

## 2019-01-03 RX ORDER — POLYETHYLENE GLYCOL 3350 17 G/17G
17 POWDER, FOR SOLUTION ORAL
Status: DISCONTINUED | OUTPATIENT
Start: 2019-01-04 | End: 2019-01-08

## 2019-01-03 RX ORDER — ATORVASTATIN CALCIUM 10 MG/1
10 TABLET, FILM COATED ORAL NIGHTLY
Status: DISCONTINUED | OUTPATIENT
Start: 2019-01-03 | End: 2019-01-08

## 2019-01-03 RX ORDER — SENNA AND DOCUSATE SODIUM 50; 8.6 MG/1; MG/1
1 TABLET, FILM COATED ORAL DAILY
Status: ON HOLD | COMMUNITY
End: 2021-01-01

## 2019-01-03 RX ORDER — MULTIPLE VITAMINS W/ MINERALS TAB 9MG-400MCG
1 TAB ORAL DAILY
Status: DISCONTINUED | OUTPATIENT
Start: 2019-01-03 | End: 2019-01-08

## 2019-01-03 RX ORDER — HYDROMORPHONE HYDROCHLORIDE 1 MG/ML
0.3 INJECTION, SOLUTION INTRAMUSCULAR; INTRAVENOUS; SUBCUTANEOUS EVERY 4 HOURS PRN
Status: DISCONTINUED | OUTPATIENT
Start: 2019-01-03 | End: 2019-01-08

## 2019-01-03 RX ORDER — HYDROCODONE BITARTRATE AND ACETAMINOPHEN 5; 325 MG/1; MG/1
1 TABLET ORAL EVERY 6 HOURS PRN
Status: DISCONTINUED | OUTPATIENT
Start: 2019-01-03 | End: 2019-01-08

## 2019-01-03 RX ORDER — HYDROCHLOROTHIAZIDE 25 MG/1
25 TABLET ORAL DAILY
Status: DISCONTINUED | OUTPATIENT
Start: 2019-01-03 | End: 2019-01-08

## 2019-01-03 NOTE — H&P
[facility]    PATIENT'S NAME: Shalini Jessi   ATTENDING PHYSICIAN: Elvie Grigsby MD   PATIENT ACCOUNT#:   [id number]    LOCATION:  [place] [address] RECORD #:   [id number]       YOB: 1922  ADMISSION DATE:       01/02/ or 6 times a night, and because of the persistent discomfort she was admitted for further evaluation and monitoring.   She has a right rotator cuff tear as well and states that the arm which was painful after the initial fall has continued to also give her hydrocodone/acetaminophen 7.5/325 one tablet every 4 hours as needed for pain; irbesartan 300 mg daily; isosorbide mononitrate 60 mg daily; melatonin 10 mg nightly; multivitamin once a day; Ocuvite 1 tablet daily with breakfast; MiraLAX 17 g Monday, Wednes bilaterally. No sinus tenderness. Mucous membranes moist.  NECK:  Supple. LUNGS:  Few crackles at the bases. No wheezes or rhonchi. HEART:  Regular rate and rhythm. Normal S1, S2 with a systolic murmur. ABDOMEN:  Soft, mildly distended.   Bowel sound claustrophobia during MRIs in the past.  2.   Atrial fibrillation, rate well controlled. We will monitor on remote telemetry. 3.   Hypertension. Continue on amlodipine and hydrochlorothiazide as well as Avapro. 4.   Hypoxemia.   The patient did Jessica Quijano

## 2019-01-03 NOTE — CONSULTS
[facility]    Report of Consultation    Charlotte Rafiqamparo Patient Status:  Observation    1922 MRN [id number]   Location [facility]5W Attending Amador Otero MD   Hosp Day # 0 PCP Cherylene Allegra, MD     Date of Admission:  1 injection 0.3 mg, 0.3 mg, Intravenous, Q4H PRN  •  HYDROcodone-acetaminophen (NORCO) 5-325 MG per tab 1 tablet, 1 tablet, Oral, Q6H PRN  •  AmLODIPine Besylate (NORVASC) tab 7.5 mg, 7.5 mg, Oral, Daily  •  atorvastatin (LIPITOR) tab 10 mg, 10 mg, Oral, Nig HCT 29.5 01/02/2019     01/02/2019    CREATSERUM 1.12 01/02/2019    BUN 26 01/02/2019     01/02/2019    K 4.1 01/02/2019    CL 98 01/02/2019    CO2 23 01/02/2019     01/02/2019    CA 8.6 01/02/2019       Imaging:  Xr Routine Thoracic incontinence     Constipation     Urinary tract infection     Lumbar spinal stenosis     Diabetes mellitus type II, controlled, with no complications (HCC)     Essential hypertension     Essential hypertension with goal blood pressure less than 130/85

## 2019-01-03 NOTE — ED NOTES
Patient states morphine does not work for patient, requesting a norco, dr Joyce Doll aware, verbal order for norco 5/325

## 2019-01-03 NOTE — ED INITIAL ASSESSMENT (HPI)
Patient presents with right flank pain that radiates around to her abdomen. Patient states urinary frequency as well.

## 2019-01-03 NOTE — CM/SW NOTE
SW received MDO for Advanced Directives. Per RN pt has a current DNR form on file. SW sent DNR form to Registration to be scanned into Epic. MD will need to change pt's code status to DNR. Current HCPOA forms are in Epic.      VIRGINIE met w/ pt's daughter, Tatum Fraire harsh need for change in body postion. Patient is bed bound and is not able to reposition him/her self and needs the bed to alleviate pain in his/her (body part). It is in my opinion that a semielectric hospital bed is reasonable and necessary.     Interim Home H

## 2019-01-03 NOTE — PLAN OF CARE
Problem: Patient/Family Goals  Goal: Patient/Family Long Term Goal  Patient's Long Term Goal: Go home and prevent future falls     Interventions:  - pain management   - ambulate with staff supervision/assist  - MRI spine and chest X-ray   - See additional pain  - Anticipate increased pain with activity and pre-medicate as appropriate  Outcome: Progressing      Problem: SAFETY ADULT - FALL  Goal: Free from fall injury  INTERVENTIONS:  - Assess pt frequently for physical needs  - Identify cognitive and physic

## 2019-01-03 NOTE — ED NOTES
Orders for admission, patient is aware of plan and ready to go upstairs.  Any questions, please call ED PHILLIP Whyet at extension [phone]

## 2019-01-03 NOTE — PROGRESS NOTES
ADMISSION NOTE    80year old female with h/o A fib on eloquis, HTN  presents with acute on chronic intractable mid back pain eccentric to the right* . Available medical records partially reviewed. Dictation to follow.     Nilesh Negro M.D.  1/3/2019

## 2019-01-03 NOTE — ED PROVIDER NOTES
Patient Seen in: [facility] Emergency Department    History   Patient presents with:  Abdomen/Flank Pain (GI/)    Stated Complaint: Back pain    HPI    12-year-old female with past medical history significant for atrial fibrillation, arthritis, as noted above.     Physical Exam     ED Triage Vitals [01/02/19 0063]   /45   Pulse 67   Resp 20   Temp 97.9 °F (36.6 °C)   Temp src Oral   SpO2 95 %   O2 Device None (Room air)       Current:/59   Pulse 58   Temp 97.7 °F (36.5 °C) (Oral)   Res (*)     All other components within normal limits   CBC WITH DIFFERENTIAL WITH PLATELET    Narrative: The following orders were created for panel order CBC WITH DIFFERENTIAL WITH PLATELET.   Procedure                               Abnormality         St urinalysis is unremarkable as is her blood work. Patient refused morphine stating that it does not work for her pain. She asked for Norco which she was given. The Norco did not improve her pain.   She was then given 1/2 mg of Dilaudid as she stated that

## 2019-01-04 ENCOUNTER — APPOINTMENT (OUTPATIENT)
Dept: GENERAL RADIOLOGY | Facility: HOSPITAL | Age: 84
DRG: 542 | End: 2019-01-04
Attending: HOSPITALIST
Payer: MEDICARE

## 2019-01-04 LAB
ANION GAP SERPL CALC-SCNC: 10 MMOL/L (ref 0–18)
BASOPHILS # BLD: 0 K/UL (ref 0–0.2)
BASOPHILS NFR BLD: 0 %
BUN SERPL-MCNC: 30 MG/DL (ref 8–20)
BUN/CREAT SERPL: 33.7 (ref 10–20)
CALCIUM SERPL-MCNC: 8.4 MG/DL (ref 8.5–10.5)
CHLORIDE SERPL-SCNC: 103 MMOL/L (ref 95–110)
CO2 SERPL-SCNC: 22 MMOL/L (ref 22–32)
CREAT SERPL-MCNC: 0.89 MG/DL (ref 0.5–1.5)
EOSINOPHIL # BLD: 0.1 K/UL (ref 0–0.7)
EOSINOPHIL NFR BLD: 1 %
ERYTHROCYTE [DISTWIDTH] IN BLOOD BY AUTOMATED COUNT: 16 % (ref 11–15)
GLUCOSE SERPL-MCNC: 91 MG/DL (ref 70–99)
HCT VFR BLD AUTO: 26.9 % (ref 35–48)
HGB BLD-MCNC: 9 G/DL (ref 12–16)
LYMPHOCYTES # BLD: 1 K/UL (ref 1–4)
LYMPHOCYTES NFR BLD: 12 %
MCH RBC QN AUTO: 31.1 PG (ref 27–32)
MCHC RBC AUTO-ENTMCNC: 33.5 G/DL (ref 32–37)
MCV RBC AUTO: 92.6 FL (ref 80–100)
MONOCYTES # BLD: 0.5 K/UL (ref 0–1)
MONOCYTES NFR BLD: 6 %
NEUTROPHILS # BLD AUTO: 6.6 K/UL (ref 1.8–7.7)
NEUTROPHILS NFR BLD: 81 %
OSMOLALITY UR CALC.SUM OF ELEC: 286 MOSM/KG (ref 275–295)
PLATELET # BLD AUTO: 180 K/UL (ref 140–400)
PMV BLD AUTO: 7.9 FL (ref 7.4–10.3)
POTASSIUM SERPL-SCNC: 3.6 MMOL/L (ref 3.3–5.1)
POTASSIUM SERPL-SCNC: 4.4 MMOL/L (ref 3.3–5.1)
RBC # BLD AUTO: 2.9 M/UL (ref 3.7–5.4)
SODIUM SERPL-SCNC: 135 MMOL/L (ref 136–144)
WBC # BLD AUTO: 8.2 K/UL (ref 4–11)

## 2019-01-04 PROCEDURE — 71046 X-RAY EXAM CHEST 2 VIEWS: CPT | Performed by: HOSPITALIST

## 2019-01-04 RX ORDER — POTASSIUM CHLORIDE 20 MEQ/1
40 TABLET, EXTENDED RELEASE ORAL EVERY 4 HOURS
Status: DISPENSED | OUTPATIENT
Start: 2019-01-04 | End: 2019-01-04

## 2019-01-04 RX ORDER — POTASSIUM CHLORIDE 20 MEQ/1
40 TABLET, EXTENDED RELEASE ORAL EVERY 4 HOURS
Status: DISCONTINUED | OUTPATIENT
Start: 2019-01-04 | End: 2019-01-04

## 2019-01-04 RX ORDER — LIDOCAINE 50 MG/G
1 PATCH TOPICAL EVERY 24 HOURS
Status: DISCONTINUED | OUTPATIENT
Start: 2019-01-04 | End: 2019-01-08

## 2019-01-04 NOTE — CONSULTS
[facility]    PATIENT'S NAME: Jovanni Johnson   ATTENDING PHYSICIAN: Emani Monsalve MD   CONSULTING PHYSICIAN: Julian Das DO   PATIENT ACCOUNT#:   [de-identified]    LOCATION:  [place] [address] RECORD #:   [id number]       DATE OF BIRTH:  [birth year] reviewed. Creatinine 0.89, sodium 135, sodium was 132 yesterday. Chest x-ray personally reviewed demonstrating perhaps very minimal congestive change. Echocardiogram demonstrating moderate mitral regurgitation, preserved ejection fraction.     EKG de

## 2019-01-04 NOTE — PLAN OF CARE
Problem: Patient/Family Goals  Goal: Patient/Family Long Term Goal  Patient's Long Term Goal: Go home and prevent future falls     Interventions:  - pain management   - ambulate with staff supervision/assist  - MRI spine and chest X-ray   - See additional Anticipate increased pain with activity and pre-medicate as appropriate  Outcome: Progressing      Problem: SAFETY ADULT - FALL  Goal: Free from fall injury  INTERVENTIONS:  - Assess pt frequently for physical needs  - Identify cognitive and physical defic

## 2019-01-04 NOTE — CM/SW NOTE
Family requesting, Dr. Gus Ward as attending physician. / to remain available for support and/or discharge planning. Wild Carr RN Case Manager   Ext.  [phone]

## 2019-01-04 NOTE — CM/SW NOTE
Addend 604p:    Hospital bed still pending w/ HME; awaiting signed HME order form & hospital bed documentation.  SW spoke w/ Dr. Jackson Alexandre and informed him of needed Gustavo  orders w/ RN/PT/OT, sign HME order form located on pt's chart and hospital bed documentati ----------------------------------------------------------------------------  Pt's family requesting hospital bed and wheelchair. Lola Yoon from [address] [place] [address] has been informed of the referral and stated that pt will have a qualifying dx of CHF.      [facility] order for

## 2019-01-05 ENCOUNTER — APPOINTMENT (OUTPATIENT)
Dept: GENERAL RADIOLOGY | Facility: HOSPITAL | Age: 84
DRG: 542 | End: 2019-01-05
Attending: INTERNAL MEDICINE
Payer: MEDICARE

## 2019-01-05 PROCEDURE — 73030 X-RAY EXAM OF SHOULDER: CPT | Performed by: INTERNAL MEDICINE

## 2019-01-05 NOTE — PROGRESS NOTES
cardiology progress note    24 h events BP well controlled     Current Facility-Administered Medications:  apixaban (ELIQUIS) tab 2.5 mg 2.5 mg Oral BID Julian Li DO 2.5 mg at 01/05/19 0947   lidocaine (LIDODERM) 5 % 1 patch 1 patch Transdermal Q24H (SENOKOT S) 8.6-50 MG tab 1 tablet 1 tablet Oral Daily Abi Arzate MD 1 tablet at 01/05/19 0993   vitamin A (AQUASOL A) cap 10,000 Units 10,000 Units Oral Daily Abi MD Carito 10,000 Units at 01/04/19 1239   Cyclobenzaprine HCl (FLEXERI Moderate mitral regurgitation. RECOMMENDATIONS:  She received 1 dose of IV Lasix. Currently saturating normally, and we can continue the hydrochlorothiazide. I do not think we need to add Lasix to her daily regimen.   There is a followup chest x-ray o

## 2019-01-05 NOTE — H&P
[facility]    HISTORY AND PHYSICAL EXAMINATION    Valentin Yuen Patient Status:  Inpatient    1922 MRN [id number]   Location [facility]5W Attending Ashleigh Mcdowell MD   [facility] Day # 2 PCP Melissa Taveras MD     Note entered  motor neurologic deficit. Normal affect and behavior. Reduced range of motion RUE. Data:  Reports on results of CBC, BMP, thoracic spine x-ray 1/2/19; MRI thoracic spine 1/3/19; CBC, BMP 1/4/19 all noted.     ASSESSMENT AND PLAN   * = Admitting/principal DNR  · Short-term plan - Finish acute care in hospital, consider [facility].   · Long-term plan - Home with caregiver      Endy Field MD  1/6/2019

## 2019-01-05 NOTE — PROGRESS NOTES
[facility] - [facility]    PROGRESS NOTE    Jerardo Kenyon Patient Status:  Inpatient    1922 MRN [id number]   Location [facility]5W Attending Cathy Mayberry MD   Hosp Day # 2 PCP Jen Justice MD     Note entered on 19.   Patient seen a Average systolic blood pressure marginally elevated. Keep same Rx. Monitor closely. Adjust Rx as indicated to keep SBP in target range. · Atrial fibrillation: Chronic. Slow, due to intrinsic heart block. Continue Eliquis. · Heart block: Intrinsic.

## 2019-01-06 LAB
FERRITIN SERPL IA-MCNC: 68 NG/ML (ref 11–307)
FOLATE SERPL-MCNC: >23.4 NG/ML
IRON SATN MFR SERPL: 11 % (ref 15–50)
IRON SERPL-MCNC: 29 MCG/DL (ref 28–170)
RETICS/RBC NFR AUTO: 1.6 % (ref 0.5–1.5)
TIBC SERPL-MCNC: 256 MCG/DL (ref 228–428)
TRANSFERRIN SERPL-MCNC: 194 MG/DL (ref 192–382)
VIT B12 SERPL-MCNC: 168 PG/ML (ref 181–914)

## 2019-01-06 NOTE — CONSULTS
[facility] - [facility]    Report of Consultation    Norberto Hammond Patient Status:  Inpatient    1922 MRN [id number]   Location [facility]5W Attending Petra Cooper MD   Hosp Day # 3 PCP Heraclio Robles MD     Date of Admission:  2019 around the trunk. She has no numbness or tingling. She has no chest pain or shortness of breath. She has no fevers or chills. She also complains of ongoing right shoulder pain at this time.  She has been under Dr. Lynne Laguerre care for a closed right prox Fibrocystic breast disease    • Hemorrhoids    • High cholesterol    • Humerus fracture 11/04/2018   • Intervertebral disc stenosis of neural canal of lumbar region    • Osteoarthritis of shoulder     \"Drain/inject\"   • Pulmonary HTN ([facility] [address] 11/16    by EC 1 tablet Oral Daily with breakfast   PEG 3350 (MIRALAX) powder packet 17 g 17 g Oral Once per day on Mon Wed Fri   Senna-Docusate Sodium (SENOKOT S) 8.6-50 MG tab 1 tablet 1 tablet Oral Daily   vitamin A (AQUASOL A) cap 10,000 Units 10,000 Units Oral Daily (69.5 kg), SpO2 93 %, not currently breastfeeding. General: Well nourished elderly female in no acute distress. AO x 3. Cooperative. Able to follow commands and answer questions appropriately.      Neck: On further examination of the cervical spine there degrees with pain. Passive internal rotation of the right shoulder with the right shoulder abducted to 65 or 70 degrees is to approximately 40 degrees with pain. Impingement signs were not tested at this time.  She has weakness with right shoulder forward f complete joint space narrowing and subchondral sclerosis of the humeral head and glenoid. There is some flattening of the humeral head but no evidence of osteonecrosis at this time. There is degenerative change of the [facility] joint as well.  There is significant fracture. I discussed with patient that given the above she will likely not regain full ROM and strength in the right shoulder.  However, I did discuss that hopefully continued physical therapy for the right shoulder will allow her to return to her baseline x-ray imaging of the spine. Thank you for allowing me to participate in the care of your patient. Ethel Cai [place]  [facility]  [phone]  1/6/2019  Agree with above.     Devin Salvador MD

## 2019-01-06 NOTE — PROGRESS NOTES
[facility]    PROGRESS NOTE    Yudy Cesar Patient Status:  Inpatient    1922 MRN [id number]   Location [facility]5W Attending Sera Rodriguez MD   Hosp Day # 3 PCP Qiana Roberts MD     Note entered on 19.   Patient seen a Intrinsic. Asymptomatic. Monitor for symptoms. · Heart failure: Chronic.  Diastolic.  Controlled.  Keep same Rx. Minimize edemagenic medication. Lymph ·     Heme · Anemia:  Subacute. Cause not clear. Check basic workup.    Endo · Hyperlipidemia: Keep

## 2019-01-06 NOTE — PHYSICAL THERAPY NOTE
PHYSICAL THERAPY EVALUATION - INPATIENT     Room Number: 586/528-I  Evaluation Date: 1/6/2019  Type of Evaluation: Initial   Physician Order: PT Eval and Treat    Presenting Problem: intractable pain  Reason for Therapy: Mobility Dysfunction and Discharge education; Family education;Gait training;Strengthening;Transfer training;Balance training  Rehab Potential : Good  Frequency (Obs): (3-5x/wk)  Number of Visits to Meet Established Goals: (1-16-19)    PHYSICAL THERAPY MEDICAL/SOCIAL HISTORY     History rela ASSESSMENT  Ratin  Location: R shoulder and back  Management Techniques: Activity promotion; Body mechanics; Relaxation;Repositioning    COGNITION  Pt is alert and oriented x 4.     RANGE OF MOTION AND STRENGTH ASSESSMENT  Right Upper extremity ROM and st 1-16-19  Patient Goal Patient's self-stated goal is:   To get stronger again   Goal #1 Patient is able to demonstrate supine - sit EOB @ level: modified independent     Goal #1   Current Status    Goal #2 Patient is able to demonstrate transfers Sit to/from

## 2019-01-06 NOTE — PROGRESS NOTES
cardiology progress note    24 h events BP well controlled     Current Facility-Administered Medications:  apixaban (ELIQUIS) tab 2.5 mg 2.5 mg Oral BID Julian Li DO 2.5 mg at 01/05/19 0947   lidocaine (LIDODERM) 5 % 1 patch 1 patch Transdermal Q24H (SENOKOT S) 8.6-50 MG tab 1 tablet 1 tablet Oral Daily Vin Zapata MD 1 tablet at 01/05/19 0993   vitamin A (AQUASOL A) cap 10,000 Units 10,000 Units Oral Daily Vin Zapata MD 10,000 Units at 01/04/19 1239   Cyclobenzaprine HCl (FLEXERI Moderate mitral regurgitation. RECOMMENDATIONS:  She received 1 dose of IV Lasix. Currently saturating normally, and we can continue the hydrochlorothiazide. I do not think we need to add Lasix to her daily regimen.   There is a followup chest x-ray o

## 2019-01-07 RX ORDER — CYANOCOBALAMIN 1000 UG/ML
1000 INJECTION INTRAMUSCULAR; SUBCUTANEOUS
Status: DISCONTINUED | OUTPATIENT
Start: 2019-01-08 | End: 2019-01-08

## 2019-01-07 RX ORDER — CYANOCOBALAMIN 1000 UG/ML
1000 INJECTION INTRAMUSCULAR; SUBCUTANEOUS
Status: DISCONTINUED | OUTPATIENT
Start: 2019-01-07 | End: 2019-01-07

## 2019-01-07 RX ORDER — MELATONIN
325 2 TIMES DAILY WITH MEALS
Status: DISCONTINUED | OUTPATIENT
Start: 2019-01-07 | End: 2019-01-08

## 2019-01-07 NOTE — PHYSICAL THERAPY NOTE
PHYSICAL THERAPY TREATMENT NOTE - INPATIENT     Room Number: 384/020-Z       Presenting Problem: intractable pain    Problem List  Principal Problem:    Intractable back pain      PHYSICAL THERAPY ASSESSMENT     Patient received sitting up in chair at [facility]. Research supports that patients with this level of impairment may benefit from Sub-acute rehab to optimize functional outcomes.     DISCHARGE RECOMMENDATIONS  PT Discharge Recommendations: Sub-acute rehabilitation     PLAN  PT Tr walker  Pattern: Shuffle(forward flexed posture; decreased oneil speed)  Stoop/Curb Assistance: Not tested       Additional information: UE AAROM and distraction 0-85*, closer to 90* pt c/o pain and compensatory shoulder elevation    THERAPEUTIC EXERCISE

## 2019-01-07 NOTE — CM/SW NOTE
SW sent clinical updates to [facility]. VIRGINIE spoke w/ Karri Tanner from [facility] who stated they are currently awaiting on insurance authorization. VIRGINIE met w/ pt and pt's daughter, Lennox Santiago to update them regarding eventual plans.      Pt's dtr stated that i

## 2019-01-07 NOTE — PROGRESS NOTES
cardiology progress note    24 h events BP well controlled     Current Facility-Administered Medications:  apixaban (ELIQUIS) tab 2.5 mg 2.5 mg Oral BID Julian Li DO 2.5 mg at 01/05/19 0947   lidocaine (LIDODERM) 5 % 1 patch 1 patch Transdermal Q24H (SENOKOT S) 8.6-50 MG tab 1 tablet 1 tablet Oral Daily Sacha Young MD 1 tablet at 01/05/19 0946   vitamin A (AQUASOL A) cap 10,000 Units 10,000 Units Oral Daily Sacha Young MD 10,000 Units at 01/04/19 1239   Cyclobenzaprine HCl (FLEXERI Moderate mitral regurgitation. RECOMMENDATIONS:  She received 1 dose of IV Lasix. Currently saturating normally, and we can continue the hydrochlorothiazide. I do not think we need to add Lasix to her daily regimen.       Thank you for allowing me to

## 2019-01-07 NOTE — PAYOR COMM NOTE
--------------  ADMISSION REVIEW     Payor: Celia Velasco  Subscriber #:  [id number] Number: [id number]    Admit date: 1/3/19  Admit time: 9437       Patient Seen in: [facility] Emergency Department    History   Patient presents Device None (Room air)     Constitutional: AAOx3, well nourished, NAD  Head: Normocephalic and atraumatic.    Ears: TM's clear b/l  Nose: Nose normal.   Mouth/Throat: MMM, post OP clear with no exudates  Eyes: Conjunctivae and EOM are normal. PERRLA  Neck: improve her pain. She was then given 1/2 mg of Dilaudid as she stated that her pain was still too severe to go home. Her pain is down to a 5 at this time. Patient states she will not be able to go home unless her pain is down to a 3 or 4.   Second dose o Reports on results of CBC, BMP, thoracic spine x-ray 1/2/19; MRI thoracic spine 1/3/19; CBC, BMP 1/4/19 all noted.     ASSESSMENT AND PLAN   * = Admitting/principal diagnos(i/e)s  Const •    Funct • *  Functional impairment: Chronic multifactorial mobility to me who came in with back pain, found to have musculoskeletal etiology of back pain, also minimal acute T8 compression fracture. Because her pain has improved, there was no need for a kyphoplasty.   As far as her respiratory status goes, she has not real exacerbation, no pleural effusion appreciated. Patient has been maintained on hydrochlorothiazide 25 mg daily. 2.       Hypertension. Initial blood pressure was elevated but subsequently has been controlled. 3.       Chronic atrial fibrillation.   Olman Admitting/principal diagnos(i/e)s  Const ·     Funct · *  Functional impairment: Chronic multifactorial mobility impairment including lumbar spinal stenosis, now complicated by subacute right humeral fracture and acute T8 thoracic spinal compression fractu medication, and without opiate analgesic. PT recommends BETH. PMSH:  Reviewed. Medications:  Reviewed.   ROS:  No report of fever, acute pain, skin breakdown or any acute respiratory, GI, urinary, gynecologic, cardiovascular, hematologic, endocrine, neuro · Insomnia: Try to reduce nocturia.  Continue melatonin.     MS · *  Thoracic spinal compression fracture: Acute T8, additional multilevel chronic fractures.  Pain control.  EASTON., anticipate BETH. · Lumbar spinal stenosis: Chronic sciatica controlled.   ·

## 2019-01-07 NOTE — PROGRESS NOTES
[facility]    PROGRESS NOTE    Kathrine Velez Patient Status:  Inpatient    1922 MRN [id number]   Location [facility]5W Attending Andrew Christianson MD   [facility] Day # 4 PCP Melinda Valdovinos MD     Note entered on 19.   Patient seen a  PT/OT. Anticipate BETH. Integ ·     Resp ·     GI · Constipation: Chronic.  Anticipate worsening if she requires opiates for pain control.  Bowel program as needed.    Urinary · Nocturia: Chronic.  Due to renal aging, probably accompanied by overactive bl Right shoulder. Further adding to challenges in the recovery of  RUE function following right humeral fracture. Continue pain control and rehab.        CMN for DME  On 1/7/2019 I had a face to face encounter with Kay Bhatt [facility]

## 2019-01-07 NOTE — PLAN OF CARE
Problem: Patient/Family Goals  Goal: Patient/Family Long Term Goal  Patient's Long Term Goal: Go home and prevent future falls     Interventions:  - pain management   - ambulate with staff supervision/assist  - MRI spine and chest X-ray   - See additional anxiety  - Utilize distraction and/or relaxation techniques  - Monitor for opioid side effects  - Notify MD/LIP if interventions unsuccessful or patient reports new pain  - Anticipate increased pain with activity and pre-medicate as appropriate  Outcome: P

## 2019-01-07 NOTE — PHYSICAL THERAPY NOTE
Attempted PT treatment session; pt supine in bed- pt's dtr present in room, \"not now, she just got back to bed to rest.  Come back later. Not now. \" Therapist will return at 16:00 to re-attempt.   Thank you,  Martínez Machuca, PT, DPT

## 2019-01-08 VITALS
HEART RATE: 65 BPM | HEIGHT: 62 IN | SYSTOLIC BLOOD PRESSURE: 152 MMHG | RESPIRATION RATE: 18 BRPM | DIASTOLIC BLOOD PRESSURE: 45 MMHG | WEIGHT: 153.19 LBS | BODY MASS INDEX: 28.19 KG/M2 | OXYGEN SATURATION: 95 % | TEMPERATURE: 98 F

## 2019-01-08 PROBLEM — M48.50XA VERTEBRAL COMPRESSION FRACTURE (HCC): Status: ACTIVE | Noted: 2019-01-08

## 2019-01-08 RX ORDER — MELATONIN
325 2 TIMES DAILY WITH MEALS
Refills: 0 | Status: ON HOLD | COMMUNITY
Start: 2019-01-08 | End: 2021-01-01

## 2019-01-08 RX ORDER — CYANOCOBALAMIN 1000 UG/ML
1000 INJECTION INTRAMUSCULAR; SUBCUTANEOUS
Refills: 0 | Status: ON HOLD | COMMUNITY
Start: 2019-02-07 | End: 2021-01-01

## 2019-01-08 RX ORDER — LIDOCAINE 50 MG/G
PATCH TOPICAL
Refills: 0 | Status: ON HOLD | COMMUNITY
Start: 2019-01-08 | End: 2021-01-01

## 2019-01-08 NOTE — CM/SW NOTE
Addend 216p:     SW spoke w/ Kobe Schumacher from [facility] [phone], and arranged peer to peer appeal for 330pm, due to being the soonest time available for today. Jagdeep SILVA: Dr. Katja Faustin will contact Dr. Garsia Catching at 330pm.    Carine long MD w/ update.

## 2019-01-08 NOTE — OCCUPATIONAL THERAPY NOTE
OCCUPATIONAL THERAPY EVALUATION - INPATIENT     Room Number: 376/418-E  Evaluation Date: 1/8/2019  Type of Evaluation: Initial  Presenting Problem: (intractable back pain)    Physician Order: IP Consult to Occupational Therapy  Reason for Therapy: ADL/IADL Device Recommendations: TBD    PLAN  OT Treatment Plan: Balance activities; Energy conservation/work simplification techniques;ADL training;Functional transfer training; Endurance training;Patient/Family education;Patient/Family training;Equipment eval/educa with independence. Pt was ambulating with use of RW at baseline.      SUBJECTIVE  \"I used to shower on my own but now Charly Click is there when I take a bath\"     OCCUPATIONAL THERAPY EXAMINATION      OBJECTIVE  Precautions: Bed/chair alarm;Cardiac;Limb alert fair-    FUNCTIONAL ADL ASSESSMENT  Grooming: set up  Feeding: set up   Bathing: mod a   Toileting: min a   Upper Extremity Dressing: min a   Lower Extremity Dressing: mod a     Education Provided: OT educated patient on OT scope of practice, purpose of ev

## 2019-01-08 NOTE — PROGRESS NOTES
cardiology progress note    24 h events BP well controlled     Current Facility-Administered Medications:  apixaban (ELIQUIS) tab 2.5 mg 2.5 mg Oral BID Julian Li DO 2.5 mg at 01/05/19 0947   lidocaine (LIDODERM) 5 % 1 patch 1 patch Transdermal Q24H (SENOKOT S) 8.6-50 MG tab 1 tablet 1 tablet Oral Daily Vickey Vaughan MD 1 tablet at 01/05/19 0946   vitamin A (AQUASOL A) cap 10,000 Units 10,000 Units Oral Daily Vickey Vaughan MD 10,000 Units at 01/04/19 1239   Cyclobenzaprine HCl (FLEXERI Moderate mitral regurgitation. RECOMMENDATIONS:  Currently saturating normally, and we can continue the hydrochlorothiazide. I do not think we need to add Lasix to her daily regimen.       Thank you for allowing me to participate in the care of your pa

## 2019-01-08 NOTE — PLAN OF CARE
Problem: Patient/Family Goals  Goal: Patient/Family Long Term Goal  Patient's Long Term Goal: Go home and prevent future falls     Interventions:  - pain management   - ambulate with staff supervision/assist  - MRI spine and chest X-ray   - See additional and evaluate response  - Consider cultural and social influences on pain and pain management  - Manage/alleviate anxiety  - Utilize distraction and/or relaxation techniques  - Monitor for opioid side effects  - Notify MD/LIP if interventions unsuccessful o

## 2019-01-08 NOTE — CM/SW NOTE
Dr. Zluema Liriano informed SW that he spoke w/ Dimitrios's MD and they will approve pt's rehab at [facility].  SW confirmed w/ Declan Guzmán from [facility] they have received authorization and will be ready for pt at 545pm.     SW informed Declan Guzmán from [facility]

## 2019-01-08 NOTE — PHYSICAL THERAPY NOTE
PHYSICAL THERAPY TREATMENT NOTE - INPATIENT     Room Number: 990/816-V       Presenting Problem: intractable pain    Problem List  Principal Problem:    Intractable back pain      PHYSICAL THERAPY ASSESSMENT     Patient recevied sitting in bedside chair; O Static Sitting: Good  Dynamic Sitting: Fair +           Static Standing: Fair  Dynamic Standing: Fair -    ACTIVITY TOLERANCE  Pulse: 63  Heart Rate Source: Websupportring-Plough Goal #3   Current Status  100ftx 2 Min A x 1   Goal #4 Patient to demonstrate independence with home activity/exercise instructions provided to patient in preparation for discharge.    Goal #4   Current Status  ongoing

## 2019-01-13 NOTE — DISCHARGE SUMMARY
[facility]    Discharge Summary    Heena Mcgarry Patient Status:  Inpatient    1922 MRN [id number]   Location [address] [facility] Attending No att. providers found   Hosp Day # 5 PCP Genevieve Harper MD     Date of Admission: 2019 inpatient by Dr. Melanie Denise on her first day in the hospital. Transferred to my service on request from family when they realized I would take care of her in the hospital, as I am her current primary physician.   Additional problems include loss of function r take this      Take 1 tablet (300 mg total) by mouth nightly.    Quantity:  90 tablet  Refills:  3        CONTINUE taking these medications      Instructions Prescription details   ALPRAZolam 0.25 MG Tabs  Commonly known as:  XANAX      Take 1 tablet (0.25 primary medical care with Dr. Betty De Leon at [facility]. Follow up Labs: None. Other Discharge Instructions: Emphasize sit/stand exercises for gluteal and quadriceps muscle strengthening.     Montana Tucker MD  1/13/2019  9:31 AM

## 2019-05-30 ENCOUNTER — PATIENT OUTREACH (OUTPATIENT)
Dept: CASE MANAGEMENT | Age: 84
End: 2019-05-30

## 2019-05-30 NOTE — PROGRESS NOTES
Pt is eligible for 2019 Medicare Annual Health Assessment visit. Left message to call back [phone]. Prieto Fletcher

## 2019-08-23 ENCOUNTER — TELEPHONE (OUTPATIENT)
Dept: CASE MANAGEMENT | Age: 84
End: 2019-08-23

## 2019-11-26 ENCOUNTER — TELEPHONE (OUTPATIENT)
Dept: CASE MANAGEMENT | Age: 84
End: 2019-11-26

## 2019-11-26 NOTE — TELEPHONE ENCOUNTER
Pt is eligible for 2019 Medicare Annual Health Assessment visit. Left message to call back [phone].

## 2020-01-27 RX ORDER — ATORVASTATIN CALCIUM 10 MG/1
TABLET, FILM COATED ORAL
Qty: 90 TABLET | Refills: 2 | Status: ON HOLD | OUTPATIENT
Start: 2020-01-27 | End: 2021-01-01

## 2020-02-24 RX ORDER — ISOSORBIDE MONONITRATE 60 MG/1
TABLET, EXTENDED RELEASE ORAL
Qty: 90 TABLET | Refills: 2 | OUTPATIENT
Start: 2020-02-24

## 2020-03-17 ENCOUNTER — APPOINTMENT (OUTPATIENT)
Dept: GENERAL RADIOLOGY | Facility: HOSPITAL | Age: 85
DRG: 202 | End: 2020-03-17
Attending: EMERGENCY MEDICINE
Payer: MEDICARE

## 2020-03-17 ENCOUNTER — HOSPITAL ENCOUNTER (INPATIENT)
Facility: HOSPITAL | Age: 85
LOS: 6 days | Discharge: HOME | DRG: 202 | End: 2020-03-23
Attending: EMERGENCY MEDICINE | Admitting: INTERNAL MEDICINE
Payer: MEDICARE

## 2020-03-17 DIAGNOSIS — I50.9 ACUTE ON CHRONIC CONGESTIVE HEART FAILURE, UNSPECIFIED HEART FAILURE TYPE (HCC): Primary | ICD-10-CM

## 2020-03-17 LAB
ADENOVIRUS PCR:: NEGATIVE
ANION GAP SERPL CALC-SCNC: 10 MMOL/L (ref 0–18)
B PERT DNA SPEC QL NAA+PROBE: NEGATIVE
BASOPHILS # BLD AUTO: 0.01 X10(3) UL (ref 0–0.2)
BASOPHILS NFR BLD AUTO: 0.1 %
BUN BLD-MCNC: 29 MG/DL (ref 7–18)
BUN/CREAT SERPL: 29.3 (ref 10–20)
C PNEUM DNA SPEC QL NAA+PROBE: NEGATIVE
CALCIUM BLD-MCNC: 8.8 MG/DL (ref 8.5–10.1)
CHLORIDE SERPL-SCNC: 98 MMOL/L (ref 98–112)
CHOLEST SMN-MCNC: 145 MG/DL (ref ?–200)
CO2 SERPL-SCNC: 23 MMOL/L (ref 21–32)
CORONAVIRUS 229E PCR:: NEGATIVE
CORONAVIRUS HKU1 PCR:: NEGATIVE
CORONAVIRUS NL63 PCR:: NEGATIVE
CORONAVIRUS OC43 PCR:: NEGATIVE
CREAT BLD-MCNC: 0.99 MG/DL (ref 0.55–1.02)
DEPRECATED RDW RBC AUTO: 49.6 FL (ref 35.1–46.3)
EOSINOPHIL # BLD AUTO: 0 X10(3) UL (ref 0–0.7)
EOSINOPHIL NFR BLD AUTO: 0 %
ERYTHROCYTE [DISTWIDTH] IN BLOOD BY AUTOMATED COUNT: 14.2 % (ref 11–15)
FLUAV RNA SPEC QL NAA+PROBE: NEGATIVE
FLUBV RNA SPEC QL NAA+PROBE: NEGATIVE
GLUCOSE BLD-MCNC: 199 MG/DL (ref 70–99)
HCT VFR BLD AUTO: 34.1 % (ref 35–48)
HDLC SERPL-MCNC: 92 MG/DL (ref 40–59)
HGB BLD-MCNC: 11.4 G/DL (ref 12–16)
IMM GRANULOCYTES # BLD AUTO: 0.06 X10(3) UL (ref 0–1)
IMM GRANULOCYTES NFR BLD: 0.7 %
LDLC SERPL CALC-MCNC: 43 MG/DL (ref ?–100)
LYMPHOCYTES # BLD AUTO: 0.39 X10(3) UL (ref 1–4)
LYMPHOCYTES NFR BLD AUTO: 4.7 %
MCH RBC QN AUTO: 31.8 PG (ref 26–34)
MCHC RBC AUTO-ENTMCNC: 33.4 G/DL (ref 31–37)
MCV RBC AUTO: 95.3 FL (ref 80–100)
METAPNEUMOVIRUS PCR:: POSITIVE
MONOCYTES # BLD AUTO: 0.32 X10(3) UL (ref 0.1–1)
MONOCYTES NFR BLD AUTO: 3.9 %
MYCOPLASMA PNEUMONIA PCR:: NEGATIVE
NEUTROPHILS # BLD AUTO: 7.5 X10 (3) UL (ref 1.5–7.7)
NEUTROPHILS # BLD AUTO: 7.5 X10(3) UL (ref 1.5–7.7)
NEUTROPHILS NFR BLD AUTO: 90.6 %
NONHDLC SERPL-MCNC: 53 MG/DL (ref ?–130)
NT-PROBNP SERPL-MCNC: 6588 PG/ML (ref ?–450)
OSMOLALITY SERPL CALC.SUM OF ELEC: 283 MOSM/KG (ref 275–295)
PARAINFLUENZA 1 PCR:: NEGATIVE
PARAINFLUENZA 2 PCR:: NEGATIVE
PARAINFLUENZA 3 PCR:: NEGATIVE
PARAINFLUENZA 4 PCR:: NEGATIVE
PLATELET # BLD AUTO: 146 10(3)UL (ref 150–450)
POTASSIUM SERPL-SCNC: 4.3 MMOL/L (ref 3.5–5.1)
RBC # BLD AUTO: 3.58 X10(6)UL (ref 3.8–5.3)
RHINOVIRUS/ENTERO PCR:: NEGATIVE
RSV RNA SPEC QL NAA+PROBE: NEGATIVE
SODIUM SERPL-SCNC: 131 MMOL/L (ref 136–145)
TRIGL SERPL-MCNC: 52 MG/DL (ref 30–149)
TROPONIN I SERPL-MCNC: 0.07 NG/ML (ref ?–0.04)
TROPONIN I SERPL-MCNC: 0.14 NG/ML (ref ?–0.04)
VLDLC SERPL CALC-MCNC: 10 MG/DL (ref 0–30)
WBC # BLD AUTO: 8.3 X10(3) UL (ref 4–11)

## 2020-03-17 PROCEDURE — 71045 X-RAY EXAM CHEST 1 VIEW: CPT | Performed by: EMERGENCY MEDICINE

## 2020-03-17 RX ORDER — ASPIRIN 81 MG/1
81 TABLET ORAL DAILY
Status: DISCONTINUED | OUTPATIENT
Start: 2020-03-18 | End: 2020-03-23

## 2020-03-17 RX ORDER — AMLODIPINE BESYLATE 5 MG/1
5 TABLET ORAL ONCE
Status: DISCONTINUED | OUTPATIENT
Start: 2020-03-17 | End: 2020-03-17

## 2020-03-17 RX ORDER — MELATONIN
325 2 TIMES DAILY WITH MEALS
Status: DISCONTINUED | OUTPATIENT
Start: 2020-03-17 | End: 2020-03-23

## 2020-03-17 RX ORDER — AMLODIPINE BESYLATE 10 MG/1
10 TABLET ORAL DAILY
Status: DISCONTINUED | OUTPATIENT
Start: 2020-03-18 | End: 2020-03-17

## 2020-03-17 RX ORDER — AMLODIPINE BESYLATE 5 MG/1
5 TABLET ORAL DAILY
Status: DISCONTINUED | OUTPATIENT
Start: 2020-03-18 | End: 2020-03-17

## 2020-03-17 RX ORDER — LOSARTAN POTASSIUM 100 MG/1
100 TABLET ORAL DAILY
Status: DISCONTINUED | OUTPATIENT
Start: 2020-03-17 | End: 2020-03-23

## 2020-03-17 RX ORDER — ALBUTEROL SULFATE 2.5 MG/3ML
2.5 SOLUTION RESPIRATORY (INHALATION) ONCE
Status: DISCONTINUED | OUTPATIENT
Start: 2020-03-17 | End: 2020-03-17

## 2020-03-17 RX ORDER — ALBUTEROL SULFATE 90 UG/1
2 AEROSOL, METERED RESPIRATORY (INHALATION) EVERY 4 HOURS PRN
Status: DISCONTINUED | OUTPATIENT
Start: 2020-03-17 | End: 2020-03-17 | Stop reason: RX

## 2020-03-17 RX ORDER — SPIRONOLACTONE 25 MG/1
25 TABLET ORAL DAILY
Status: DISCONTINUED | OUTPATIENT
Start: 2020-03-17 | End: 2020-03-23

## 2020-03-17 RX ORDER — ALPRAZOLAM 0.25 MG/1
0.25 TABLET ORAL NIGHTLY PRN
Status: DISCONTINUED | OUTPATIENT
Start: 2020-03-17 | End: 2020-03-23

## 2020-03-17 RX ORDER — VITS A,C,E/LUTEIN/MINERALS 300MCG-200
1 TABLET ORAL
Status: DISCONTINUED | OUTPATIENT
Start: 2020-03-18 | End: 2020-03-23

## 2020-03-17 RX ORDER — FUROSEMIDE 10 MG/ML
40 INJECTION INTRAMUSCULAR; INTRAVENOUS ONCE
Status: COMPLETED | OUTPATIENT
Start: 2020-03-17 | End: 2020-03-17

## 2020-03-17 RX ORDER — DOXEPIN HYDROCHLORIDE 50 MG/1
1 CAPSULE ORAL DAILY
Status: DISCONTINUED | OUTPATIENT
Start: 2020-03-18 | End: 2020-03-23

## 2020-03-17 RX ORDER — ATORVASTATIN CALCIUM 10 MG/1
10 TABLET, FILM COATED ORAL NIGHTLY
Status: DISCONTINUED | OUTPATIENT
Start: 2020-03-17 | End: 2020-03-23

## 2020-03-17 RX ORDER — AMLODIPINE BESYLATE 5 MG/1
5 TABLET ORAL DAILY
Status: DISCONTINUED | OUTPATIENT
Start: 2020-03-18 | End: 2020-03-18

## 2020-03-17 RX ORDER — FUROSEMIDE 10 MG/ML
40 INJECTION INTRAMUSCULAR; INTRAVENOUS
Status: DISCONTINUED | OUTPATIENT
Start: 2020-03-17 | End: 2020-03-20

## 2020-03-17 RX ORDER — HYDROCHLOROTHIAZIDE 25 MG/1
25 TABLET ORAL DAILY
Status: DISCONTINUED | OUTPATIENT
Start: 2020-03-17 | End: 2020-03-20

## 2020-03-17 RX ORDER — ISOSORBIDE MONONITRATE 60 MG/1
60 TABLET, EXTENDED RELEASE ORAL
Status: DISCONTINUED | OUTPATIENT
Start: 2020-03-18 | End: 2020-03-23

## 2020-03-17 NOTE — PLAN OF CARE
Problem: Patient Centered Care  Goal: Patient preferences are identified and integrated in the patient's plan of care  Description  Interventions:  - What would you like us to know as we care for you?  Lives at home and has 24 hour care giver coverage  -

## 2020-03-17 NOTE — PROGRESS NOTES
Chart reviewed, case discussed with ER MD, orders entered, full note in AM    Thank you for allowing me to participate in the care of your patient. please call if you have any question.      Roc Cristina MD   General Cardiology & Advanced Heart Failure, Card

## 2020-03-17 NOTE — ED PROVIDER NOTES
Patient Seen in: [facility] AND [facility] Emergency Department      History   Patient presents with:  Cough/URI  Dyspnea CLIFTON SOB    Stated Complaint: sob, temp    HPI    25-year-old female with history of hypertension, hypercholesterolemia, congestive heart cris above.    Physical Exam     ED Triage Vitals   BP 03/17/20 0946 (!) 201/75   Pulse 03/17/20 0943 87   Resp 03/17/20 0943 24   Temp 03/17/20 0943 99.4 °F (37.4 °C)   Temp src 03/17/20 0943 Oral   SpO2 03/17/20 0943 (!) 80 %   O2 Device 03/17/20 0943 None (R components:    RBC 3.58 (*)     HGB 11.4 (*)     HCT 34.1 (*)     RDW-SD 49.6 (*)     .0 (*)     Lymphocyte Absolute 0.39 (*)     All other components within normal limits   CBC WITH DIFFERENTIAL WITH PLATELET    Narrative:      The following orders provider within the next three months to obtain basic health screening including reassessment of your blood pressure.     Medications Prescribed:  Current Discharge Medication List                   Present on Admission  Date Reviewed: 1/8/2019          ICD

## 2020-03-18 LAB
ANION GAP SERPL CALC-SCNC: 7 MMOL/L (ref 0–18)
BASOPHILS # BLD AUTO: 0.02 X10(3) UL (ref 0–0.2)
BASOPHILS NFR BLD AUTO: 0.3 %
BILIRUB UR QL: NEGATIVE
BUN BLD-MCNC: 30 MG/DL (ref 7–18)
BUN/CREAT SERPL: 33.7 (ref 10–20)
CALCIUM BLD-MCNC: 8.5 MG/DL (ref 8.5–10.1)
CHLORIDE SERPL-SCNC: 95 MMOL/L (ref 98–112)
CLARITY UR: CLEAR
CO2 SERPL-SCNC: 30 MMOL/L (ref 21–32)
COLOR UR: YELLOW
CREAT BLD-MCNC: 0.89 MG/DL (ref 0.55–1.02)
DEPRECATED RDW RBC AUTO: 47.7 FL (ref 35.1–46.3)
EOSINOPHIL # BLD AUTO: 0.03 X10(3) UL (ref 0–0.7)
EOSINOPHIL NFR BLD AUTO: 0.4 %
ERYTHROCYTE [DISTWIDTH] IN BLOOD BY AUTOMATED COUNT: 13.6 % (ref 11–15)
GLUCOSE BLD-MCNC: 104 MG/DL (ref 70–99)
GLUCOSE UR-MCNC: NEGATIVE MG/DL
HCT VFR BLD AUTO: 32 % (ref 35–48)
HGB BLD-MCNC: 10.7 G/DL (ref 12–16)
HYALINE CASTS #/AREA URNS AUTO: 8 /LPF
IMM GRANULOCYTES # BLD AUTO: 0.03 X10(3) UL (ref 0–1)
IMM GRANULOCYTES NFR BLD: 0.4 %
KETONES UR-MCNC: NEGATIVE MG/DL
LEUKOCYTE ESTERASE UR QL STRIP.AUTO: NEGATIVE
LYMPHOCYTES # BLD AUTO: 0.64 X10(3) UL (ref 1–4)
LYMPHOCYTES NFR BLD AUTO: 9.2 %
MCH RBC QN AUTO: 31.5 PG (ref 26–34)
MCHC RBC AUTO-ENTMCNC: 33.4 G/DL (ref 31–37)
MCV RBC AUTO: 94.1 FL (ref 80–100)
MONOCYTES # BLD AUTO: 0.42 X10(3) UL (ref 0.1–1)
MONOCYTES NFR BLD AUTO: 6.1 %
NEUTROPHILS # BLD AUTO: 5.8 X10 (3) UL (ref 1.5–7.7)
NEUTROPHILS # BLD AUTO: 5.8 X10(3) UL (ref 1.5–7.7)
NEUTROPHILS NFR BLD AUTO: 83.6 %
NITRITE UR QL STRIP.AUTO: NEGATIVE
OSMOLALITY SERPL CALC.SUM OF ELEC: 280 MOSM/KG (ref 275–295)
PH UR: 5 [PH] (ref 5–8)
PLATELET # BLD AUTO: 138 10(3)UL (ref 150–450)
POTASSIUM SERPL-SCNC: 3.3 MMOL/L (ref 3.5–5.1)
POTASSIUM SERPL-SCNC: 5.1 MMOL/L (ref 3.5–5.1)
PROT UR-MCNC: 30 MG/DL
RBC # BLD AUTO: 3.4 X10(6)UL (ref 3.8–5.3)
RBC #/AREA URNS AUTO: 33 /HPF
SODIUM SERPL-SCNC: 132 MMOL/L (ref 136–145)
SP GR UR STRIP: 1.01 (ref 1–1.03)
TROPONIN I SERPL-MCNC: 0.4 NG/ML (ref ?–0.04)
UROBILINOGEN UR STRIP-ACNC: <2
WBC # BLD AUTO: 6.9 X10(3) UL (ref 4–11)
WBC #/AREA URNS AUTO: 4 /HPF

## 2020-03-18 RX ORDER — POTASSIUM CHLORIDE 20 MEQ/1
40 TABLET, EXTENDED RELEASE ORAL EVERY 4 HOURS
Status: COMPLETED | OUTPATIENT
Start: 2020-03-18 | End: 2020-03-18

## 2020-03-18 RX ORDER — AMLODIPINE BESYLATE 10 MG/1
10 TABLET ORAL DAILY
Status: DISCONTINUED | OUTPATIENT
Start: 2020-03-18 | End: 2020-03-23

## 2020-03-18 NOTE — CM/SW NOTE
Received MDO for Advanced Directives. Per RN rounds and chart review - pt is currently r/out for COVID 19.    SW contacted pt's dtr, Chasidy Rodriguez. Chasidy Rodriguez verified pt's address and confirmed pt lives has a CG 24/7.  Pt has 2 levels in her home - pt's bathroom is

## 2020-03-18 NOTE — PAYOR COMM NOTE
--------------  ADMISSION REVIEW     Payor: Roxanne Rao  Subscriber #:  [id number]  Authorization Number: [id number]    Admit date: 3/17/20  Admit time: 36       Admitting Physician: Osman Chavarria MD  Attending Physician:  Kylah Izquierdo • Sinusitis    • Tendonitis     Right arm   • Unspecified essential hypertension    • UTI (urinary tract infection)               Past Surgical History:   Procedure Laterality Date   • DRAIN/INJECT LARGE JOINT/BURSA     • HEMORRHOIDECTOMY     • HYSTERECT 29.3 (*)     GFR, Non- 48 (*)     GFR, -American 55 (*)     All other components within normal limits   PRO BETA NATRIURETIC PEPTIDE - Abnormal; Notable for the following components:    Pro-Beta Natriuretic Peptide 6,588 (*)     All of 25 minutes of critical care time in obtaining history, performing a physical exam, bedside monitoring of interventions, collecting and interpreting tests and discussion with consultants but not including time spent performing procedures.     Admission di last 2 days with a low-grade fever. Also, complained of lower extremity edema. On admission, was found to be in CHF so admitted for further treatment. PAST MEDICAL HISTORY:  Hypertension, atrial fibrillation, cellulitis, DJD, hyperlipidemia, UTI. Given 10 mg Oral Jennifer Muir RN      apixaban [facility]) tab 2.5 mg     Date Action Dose Route User    3/17/2020 2103 Given 2.5 mg Oral Jem Montez      aspirin EC tab 81 mg     Date Action Dose Route User    3/18/2020 0918 Given 81 mg Oral NIC Jenkins Action Dose Route User    3/18/2020 0919 Given 1 tablet Oral Gracie Mulligan RN        3/18 CARDIOLOGY CONSULT NOTE  Hosp Day # 1 PCP Maggie Miller MD      Reason for Consultation:  CHF     History of Present Illness:  Santo Wells is a a(n) 80year old f Atherosclerosis. 5. Old granulomatous disease. 6. Demineralization. 7. Scoliosis. 8. Osteoarthritis. 9. Old fractures of the right humerus and right clavicle. 10. Bilateral rotator cuff tears.     Echo 1/2019  Study Conclusions  1. Left ventricle:  Samantha Blayne elevated  - on amlodipine 5 mg daily, hctz 25 mg daily, losartan 100 mg daily, spironolactone 25 mg daily     5. metopnuemovirus  - rule out coronavirus     6.  CAD with hx of NSTEMI  - on aspirin, atorvastatin, isosorbide     Plan: volume overload on exam

## 2020-03-18 NOTE — H&P
[facility]    PATIENT'S NAME: Sandra Arceo   ATTENDING PHYSICIAN: Osito Santa MD   PATIENT ACCOUNT#:   [id number]    LOCATION:  [address] [place] [address] #:   [id number]       YOB: 1922  ADMISSION DATE:       03/17/202 rate is controlled. 4.   Infectious Disease, low-grade fever, possibly viral infection. Had respiratory panel, which showed metapneumovirus. Will start empirically on ceftriaxone. Will also check UA and urine culture.     5.   Deep venous thrombosis p

## 2020-03-18 NOTE — PLAN OF CARE
Patient alert and oriented x 4. VSS. 2L O2 via nasal cannula. Increased work of breathing. Productive cough. Contact and droplet isolation in place. Iv azithromycin. Iv lasix. Fowler in place. Fluid restriction. Will continue to monitor.     Problem: Patient

## 2020-03-18 NOTE — CONSULTS
[facility]  Report of Consultation    Travis Adame Patient Status:  Inpatient    1922 MRN [id number]   [facility] 3W/SW Attending [address], [address] Day # 1 PCP Gus Torres MD     Reason for Consultation:  [facility] atorvastatin (LIPITOR) tab 10 mg, 10 mg, Oral, Nightly  •  ferrous sulfate EC tab 325 mg, 325 mg, Oral, BID with meals  •  losartan (COZAAR) tab 100 mg, 100 mg, Oral, Daily  •  Isosorbide Mononitrate ER (IMDUR) 24 hr tab 60 mg, 60 mg, Oral, Daily  •  erica right axis -possible right ventricular hypertrophy -consider pulmonary disease or posterior fasicular block. - Anteroseptal -lateral infarct Old. ABNORMAL    CXR 3/17/20  CONCLUSION:   1.  Markings appear more prominent than January 4, 2019 suggesting [facility] bradycardia  rate controlled showing Aflutter on monitor currently    2. Mitral valve regurgitation -  moderate by echo 12/16  moderate by echo 1/2019    3.  Acute diastolic heart failure -  - volume overload on exam  - EF 20-39% grade 3 diastolic dysfuncti

## 2020-03-18 NOTE — PLAN OF CARE
Problem: Patient Centered Care  Goal: Patient preferences are identified and integrated in the patient's plan of care  Description  Interventions:  - What would you like us to know as we care for you?  Lives at home and has 24 hour care giver coverage  - Progressing   Pt states she is feeling better than yesterday. BLE edema is improving. Pt on 2L O2 NC. Pt up to the chair for meals with pivot to chair. Ceftriaxone started. Awaiting Covid 19 results.

## 2020-03-18 NOTE — PROGRESS NOTES
[facility]    Progress Note    Philis Matters Patient Status:  Inpatient    1922 MRN [id number]   Location [facility] 3W/SW Attending [address], [address] Day # 1 PCP Christiane Simmons MD       Subjective:   Yudy Mosqueda Oral, Daily  hydrochlorothiazide (HYDRODIURIL) tab 25 mg, 25 mg, Oral, Daily  azithromycin (ZITHROMAX) 500 mg in sodium chloride 0.9% 250 mL IVPB, 500 mg, Intravenous, Q24H          Results:     Lab Results   Component Value Date    WBC 6.9 03/18/2020    H Continue Iv diuretics   Cardiology following     Epistaxis   Hold am arizmendi of Eliquis   Use humidified air     Acute bronchitis   metapneumovirus infection   On  ceftriaxone and Azithromycin   R/o COVID -19   Continue PPE    HTN controlled     DVT prophyla

## 2020-03-19 LAB
ANION GAP SERPL CALC-SCNC: 7 MMOL/L (ref 0–18)
BASOPHILS # BLD AUTO: 0.02 X10(3) UL (ref 0–0.2)
BASOPHILS NFR BLD AUTO: 0.3 %
BUN BLD-MCNC: 42 MG/DL (ref 7–18)
BUN/CREAT SERPL: 39.3 (ref 10–20)
CALCIUM BLD-MCNC: 8.6 MG/DL (ref 8.5–10.1)
CHLORIDE SERPL-SCNC: 97 MMOL/L (ref 98–112)
CO2 SERPL-SCNC: 30 MMOL/L (ref 21–32)
CREAT BLD-MCNC: 1.07 MG/DL (ref 0.55–1.02)
DEPRECATED RDW RBC AUTO: 46.6 FL (ref 35.1–46.3)
EOSINOPHIL # BLD AUTO: 0.15 X10(3) UL (ref 0–0.7)
EOSINOPHIL NFR BLD AUTO: 2.4 %
ERYTHROCYTE [DISTWIDTH] IN BLOOD BY AUTOMATED COUNT: 13.6 % (ref 11–15)
GLUCOSE BLD-MCNC: 103 MG/DL (ref 70–99)
HCT VFR BLD AUTO: 30.8 % (ref 35–48)
HGB BLD-MCNC: 10.5 G/DL (ref 12–16)
IMM GRANULOCYTES # BLD AUTO: 0.02 X10(3) UL (ref 0–1)
IMM GRANULOCYTES NFR BLD: 0.3 %
LYMPHOCYTES # BLD AUTO: 0.85 X10(3) UL (ref 1–4)
LYMPHOCYTES NFR BLD AUTO: 13.7 %
MCH RBC QN AUTO: 31.8 PG (ref 26–34)
MCHC RBC AUTO-ENTMCNC: 34.1 G/DL (ref 31–37)
MCV RBC AUTO: 93.3 FL (ref 80–100)
MONOCYTES # BLD AUTO: 0.49 X10(3) UL (ref 0.1–1)
MONOCYTES NFR BLD AUTO: 7.9 %
NEUTROPHILS # BLD AUTO: 4.66 X10 (3) UL (ref 1.5–7.7)
NEUTROPHILS # BLD AUTO: 4.66 X10(3) UL (ref 1.5–7.7)
NEUTROPHILS NFR BLD AUTO: 75.4 %
OSMOLALITY SERPL CALC.SUM OF ELEC: 289 MOSM/KG (ref 275–295)
PLATELET # BLD AUTO: 154 10(3)UL (ref 150–450)
POTASSIUM SERPL-SCNC: 4.2 MMOL/L (ref 3.5–5.1)
RBC # BLD AUTO: 3.3 X10(6)UL (ref 3.8–5.3)
RT-PCR 2019 NCOV: NEGATIVE
SODIUM SERPL-SCNC: 134 MMOL/L (ref 136–145)
WBC # BLD AUTO: 6.2 X10(3) UL (ref 4–11)

## 2020-03-19 PROCEDURE — 99222 1ST HOSP IP/OBS MODERATE 55: CPT | Performed by: INTERNAL MEDICINE

## 2020-03-19 RX ORDER — GUAIFENESIN/DEXTROMETHORPHAN 100-10MG/5
100 SYRUP ORAL EVERY 4 HOURS PRN
Status: DISCONTINUED | OUTPATIENT
Start: 2020-03-19 | End: 2020-03-23

## 2020-03-19 RX ORDER — PREDNISONE 20 MG/1
40 TABLET ORAL
Status: DISCONTINUED | OUTPATIENT
Start: 2020-03-20 | End: 2020-03-23

## 2020-03-19 RX ORDER — ALBUTEROL SULFATE 90 UG/1
2 AEROSOL, METERED RESPIRATORY (INHALATION) 4 TIMES DAILY PRN
Status: DISCONTINUED | OUTPATIENT
Start: 2020-03-19 | End: 2020-03-23

## 2020-03-19 RX ORDER — DOCUSATE SODIUM 100 MG/1
100 CAPSULE, LIQUID FILLED ORAL DAILY
Status: DISCONTINUED | OUTPATIENT
Start: 2020-03-19 | End: 2020-03-23

## 2020-03-19 NOTE — PROGRESS NOTES
Patient seen in follow up.   Cough and shortness of breath are better still notices it when she lays down flat.   03/19/20  0841   BP: (!) 166/65   Pulse: 61   Resp: 21   Temp: 98.5 °F (36.9 °C)       Intake/Output Summary (Last 24 hours) at 3/19/2020 hydrochlorothiazide (HYDRODIURIL) tab 25 mg, 25 mg, Oral, Daily  azithromycin (ZITHROMAX) 500 mg in sodium chloride 0.9% 250 mL IVPB, 500 mg, Intravenous, Q24H      ATORVASTATIN 10 MG Oral Tab, TAKE 1 TABLET AT BEDTIME  cyanocobalamin 1000 MCG/ML Injection K 4.2 03/19/2020    CL 97 03/19/2020    CO2 30.0 03/19/2020     03/19/2020    CA 8.6 03/19/2020       1.  Atrial fibrillation -  on Eliquis 2.5 mg bid   off beta blocker due to nocturnal bradycardia  rate controlled showing Aflutter on monitor tessy

## 2020-03-19 NOTE — PLAN OF CARE
Problem: Patient Centered Care  Goal: Patient preferences are identified and integrated in the patient's plan of care  Description  Interventions:  - What would you like us to know as we care for you?  Lives at home and has 24 hour care giver coverage  - Progressing     Pt resting in bed comfortably, call light within reach and calls appropriately for assistance. Isolation px in place. Per pt cough is now non productive. Weaned to 1.5L o2 will attempt to wean further.  Brian while asleep rate low as 32 nons

## 2020-03-19 NOTE — CONSULTS
[facility]    Report of Consultation    Ran Lisa Patient Status:  Inpatient    1922 MRN [id number]   Location [facility] 3W/SW Attending [address], [address] [facility] Day # 2 PCP Donald Meza MD     Date of Admission Medications:  Albuterol Sulfate  (90 Base) MCG/ACT inhaler 2 puff, 2 puff, Inhalation, QID PRN  guaiFENesin-DM (ROBITUSSIN DM) 100-10 MG/5ML syrup 5 mL, 100 mg, Oral, Q4H PRN  [START ON 3/20/2020] predniSONE (DELTASONE) tab 40 mg, 40 mg, Oral, Daily Oral Tab, Take 5 mg by mouth daily. hydrochlorothiazide 25 MG Oral Tab, Take 1 tablet (25 mg total) by mouth daily. apixaban (ELIQUIS) 2.5 MG Oral Tab, Take 1 tablet (2.5 mg total) by mouth 2 (two) times daily.   OCUVITE (OCUVITE) Oral Tab, Take 1 table non tender  Extremities: no clubbing, cyanosis, edema  Neurologic: no gross motor deficits  Skin: warm, dry    Results:   Laboratory Data  Lab Results   Component Value Date    WBC 6.2 03/19/2020    HGB 10.5 (L) 03/19/2020    HCT 30.8 (L) 03/19/2020    PLT

## 2020-03-19 NOTE — PAYOR COMM NOTE
3/19 & VS WITH O2 SATS & O2 REQUIREMENTS NEEDED    CONTINUED STAY REVIEW    Payor: Odin Blackburn [facility] #:  [id number]  Authorization Number: [id number]    Admit date: 3/17/20  Admit time: Qaannivi[address] 192    Admitting Physician: Panfilo Hylton Date Action Dose Route User    3/19/2020 1400 Given 5 mL Oral Lenoard PHILLIP Acosta    3/19/2020 0523 Given 5 mL Oral Vanessa Alonso RN      hydrochlorothiazide (HYDRODIURIL) tab 25 mg     Date Action Dose Route User    3/19/2020 0859 Given 25 mg Oral Sabine Johnsono % — Nasal cannula 3 L/min MW   03/17/20 1720 — 52 20 — — — — — AS   03/17/20 1411 98.7 °F (37.1 °C) 61 20 171/67Abnormal  95 % — Nasal cannula 2 L/min AS   03/17/20 1300 — 60 20 150/59 95 % — — — CC   03/17/20 1215 — 60 20 160/68 96 % — — — CC   03/17/20 1 breastfeeding.     Constitutional: no acute distress  Eyes: PERRL  ENT: nares patent  Cardio: RRR, S1 S2  Respiratory: Bilteral expiratory wheeze present  GI: abdomen soft, non tender  Extremities: no clubbing, cyanosis, edema  Neurologic: no gross motor d 3/19/2020 0949      Gross per 24 hour   Intake 645 ml   Output 2675 ml   Net -2030 ml      Wt Readings from Last 1 Encounters:  03/19/20 : 150 lb 9.6 oz (68.3 kg)        General: No acute distress. Neck: Jugular venous pulsations not seen.   Lungs: Right-s Injection Solution, Inject 1 mL (1,000 mcg total) into the muscle every 30 (thirty) days. ferrous sulfate 325 (65 FE) MG Oral Tab EC, Take 1 tablet (325 mg total) by mouth 2 (two) times daily with meals.   Ergocalciferol (VITAMIN D2 OR), Take 15 mg by mout on monitor currently     2. Mitral valve regurgitation -  moderate by echo 12/16  moderate by echo 1/2019     3. Acute diastolic heart failure -  - volume overload on exam  - EF 25-80% grade 3 diastolic dysfunction 0/1407     4.  Malignant hypertension   -

## 2020-03-19 NOTE — CARDIAC REHAB
CARDIAC REHAB HEART FAILURE EDUCATION    Handouts provided and reviewed: CHF Booklet. Activity: Chair for all meals:        Ambulation:        Tolerated Activity:          Disease Process: Disease process reviewed.     Reviewed the following: Juventino Diana

## 2020-03-19 NOTE — PLAN OF CARE
Pt resting in chair, ambulates with assistance. Contact/droplet precautions for RO covid-19 and positive metapneumovirus. Receiving IV lasix and IV abx. Afebrile this shift. Wearing 1.5 L O2 - weaning as tolerated. PRN cough medication for cough.  Fowler int cough, deep breathe, Incentive Spirometry  - Assess the need for suctioning and perform as needed  - Assess and instruct to report SOB or any respiratory difficulty  - Respiratory Therapy support as indicated  - Manage/alleviate anxiety  - Monitor for sign

## 2020-03-19 NOTE — PROGRESS NOTES
[facility]    Progress Note    Juana Shah Patient Status:  Inpatient    1922 MRN [id number]   [facility] 3W/SW Attending [address], [address] Day # 2 PCP Yazan Taylor MD       Subjective:   Akil Murray mg, 40 mg, Intravenous, BID (Diuretic)  spironolactone (ALDACTONE) tab 25 mg, 25 mg, Oral, Daily  aspirin EC tab 81 mg, 81 mg, Oral, Daily  hydrochlorothiazide (HYDRODIURIL) tab 25 mg, 25 mg, Oral, Daily  azithromycin (ZITHROMAX) 500 mg in sodium chloride on cardiac diet     ertification      PHYSICIAN Certification of Need for Inpatient Hospitalization - Initial Certification    Patient will require inpatient services that will reasonably be expected to span two midnight's based on the clinical documenta

## 2020-03-20 ENCOUNTER — APPOINTMENT (OUTPATIENT)
Dept: CV DIAGNOSTICS | Facility: HOSPITAL | Age: 85
DRG: 202 | End: 2020-03-20
Attending: HOSPITALIST
Payer: MEDICARE

## 2020-03-20 ENCOUNTER — APPOINTMENT (OUTPATIENT)
Dept: GENERAL RADIOLOGY | Facility: HOSPITAL | Age: 85
DRG: 202 | End: 2020-03-20
Attending: INTERNAL MEDICINE
Payer: MEDICARE

## 2020-03-20 LAB
ANION GAP SERPL CALC-SCNC: 8 MMOL/L (ref 0–18)
BUN BLD-MCNC: 53 MG/DL (ref 7–18)
BUN/CREAT SERPL: 42.4 (ref 10–20)
CALCIUM BLD-MCNC: 8.9 MG/DL (ref 8.5–10.1)
CHLORIDE SERPL-SCNC: 98 MMOL/L (ref 98–112)
CO2 SERPL-SCNC: 31 MMOL/L (ref 21–32)
CREAT BLD-MCNC: 1.25 MG/DL (ref 0.55–1.02)
GLUCOSE BLD-MCNC: 110 MG/DL (ref 70–99)
NT-PROBNP SERPL-MCNC: 7620 PG/ML (ref ?–450)
OSMOLALITY SERPL CALC.SUM OF ELEC: 299 MOSM/KG (ref 275–295)
POTASSIUM SERPL-SCNC: 3.8 MMOL/L (ref 3.5–5.1)
SODIUM SERPL-SCNC: 137 MMOL/L (ref 136–145)

## 2020-03-20 PROCEDURE — 93306 TTE W/DOPPLER COMPLETE: CPT | Performed by: HOSPITALIST

## 2020-03-20 PROCEDURE — 71045 X-RAY EXAM CHEST 1 VIEW: CPT | Performed by: INTERNAL MEDICINE

## 2020-03-20 PROCEDURE — 99232 SBSQ HOSP IP/OBS MODERATE 35: CPT | Performed by: INTERNAL MEDICINE

## 2020-03-20 RX ORDER — AZITHROMYCIN 250 MG/1
500 TABLET, FILM COATED ORAL
Status: DISCONTINUED | OUTPATIENT
Start: 2020-03-21 | End: 2020-03-23

## 2020-03-20 RX ORDER — FUROSEMIDE 40 MG/1
40 TABLET ORAL DAILY
Status: DISCONTINUED | OUTPATIENT
Start: 2020-03-20 | End: 2020-03-23

## 2020-03-20 RX ORDER — FUROSEMIDE 40 MG/1
40 TABLET ORAL
Status: DISCONTINUED | OUTPATIENT
Start: 2020-03-20 | End: 2020-03-20

## 2020-03-20 NOTE — PROGRESS NOTES
Patient seen in follow up.   Cough and shortness of breath are better   03/20/20  0412   BP: 155/66   Pulse: 60   Resp: 17   Temp: 98.7 °F (37.1 °C)       Intake/Output Summary (Last 24 hours) at 3/20/2020 0842  Last data filed at 3/20/2020 Dhara hydrochlorothiazide (HYDRODIURIL) tab 25 mg, 25 mg, Oral, Daily  azithromycin (ZITHROMAX) 500 mg in sodium chloride 0.9% 250 mL IVPB, 500 mg, Intravenous, Q24H      ATORVASTATIN 10 MG Oral Tab, TAKE 1 TABLET AT BEDTIME  cyanocobalamin 1000 MCG/ML Injection CONCLUSION:  1. Increased opacification at the left lung base.     Dictated by (CST): Jack Nelson MD on 3/20/2020 at 8:14 AM     Finalized by (CST): Jack Nelson MD on 3/20/2020 at 8:16 AM            Lab Results   Component Value Date

## 2020-03-20 NOTE — PLAN OF CARE
Problem: Patient Centered Care  Goal: Patient preferences are identified and integrated in the patient's plan of care  Description  Interventions:  - What would you like us to know as we care for you?  Lives at home and has 24 hour care giver coverage  - Progressing     Pt resting in bed comfortably, no complaints overnight, call light within reach and calls appropriately. Bed alarm activated, continence care in place. Appears to be breathing better, able to wean to 0.5L O2.  Fluid restriction, I/O with fol

## 2020-03-20 NOTE — PROGRESS NOTES
[facility] Note: Route Optimization for Azithromycin [facility])    Patient is currently on Azithromycin (ZITHROMAX) 500 mg IV every 24 hours.    The patient meets the criteria to convert to the oral equivalent as established by the IV to Oral conversion

## 2020-03-20 NOTE — PHYSICAL THERAPY NOTE
Attempted to see patient for physical therapy evaluation. Patient declined, stating she wants to rest. Will attempt to see patient tomorrow for physical therapy evaluation. RN aware and agreeable.

## 2020-03-20 NOTE — PROGRESS NOTES
[facility]     Progress Note        Diamond Sheffield Patient Status:  Inpatient    1922 MRN [id number]   [facility] 3W/SW Attending [address], [address] Day # 3 PCP Artur Snow MD       Subjective:   Donna Allen tablet, Oral, Daily  Ocuvite-Lutein (OCUVITE - EYE VITAMIN) tab 1 tablet, 1 tablet, Oral, Daily with breakfast  spironolactone (ALDACTONE) tab 25 mg, 25 mg, Oral, Daily  aspirin EC tab 81 mg, 81 mg, Oral, Daily        Continuous Infusions:     Physical Exa prophylaxis: Eliquis  -Discharge planning    Yomi Davis, [address]

## 2020-03-20 NOTE — PROGRESS NOTES
[facility]    Progress Note    Norberto Aid Patient Status:  Inpatient    1922 MRN [id number]   [facility] 3W/ Attending [address], [address] Day # 3 PCP Heraclio Robles MD       Subjective:   Nhung Mejia tab 100 mg, 100 mg, Oral, Daily  Isosorbide Mononitrate ER (IMDUR) 24 hr tab 60 mg, 60 mg, Oral, Daily  melatonin cap/tab 10 mg, 10 mg, Oral, Nightly  multivitamin (ADULT) tab 1 tablet, 1 tablet, Oral, Daily  Ocuvite-Lutein (OCUVITE - EYE VITAMIN) tab 1 ta Certification    Patient will require inpatient services that will reasonably be expected to span two midnight's based on the clinical documentation in H+P.    Based on patients current state of illness, I anticipate that, after discharge, patient will requ

## 2020-03-20 NOTE — PLAN OF CARE
Problem: Patient Centered Care  Goal: Patient preferences are identified and integrated in the patient's plan of care  Description  Interventions:  - What would you like us to know as we care for you?  Lives at home and has 24 hour care giver coverage  - Progressing   A&Ox4, Echo completed-pending results. Will monitor.

## 2020-03-21 PROCEDURE — 99232 SBSQ HOSP IP/OBS MODERATE 35: CPT | Performed by: INTERNAL MEDICINE

## 2020-03-21 NOTE — PROGRESS NOTES
[facility]    Progress Note    Charlotte Rafiqamparo Patient Status:  Inpatient    1922 MRN [id number]   [facility] 3W/SW Attending [address], [address] Day # 4 PCP Cherylene Allegra, MD       Subjective:   Vernadine Sharper (COZAAR) tab 100 mg, 100 mg, Oral, Daily  Isosorbide Mononitrate ER (IMDUR) 24 hr tab 60 mg, 60 mg, Oral, Daily  melatonin cap/tab 10 mg, 10 mg, Oral, Nightly  multivitamin (ADULT) tab 1 tablet, 1 tablet, Oral, Daily  Ocuvite-Lutein (OCUVITE - EYE VITAMIN) catheter     PT/OT    certification      PHYSICIAN Certification of Need for Inpatient Hospitalization - Initial Certification    Patient will require inpatient services that will reasonably be expected to span two midnight's based on the clinical document

## 2020-03-21 NOTE — PROGRESS NOTES
[facility]     Progress Note        Tayla Enriquez Patient Status:  Inpatient    1922 MRN [id number]   [facility] 3W/SW Attending [address], [address] Day # 4 PCP Osmany Sevilla MD       Subjective:   Elicia Masterson (ADULT) tab 1 tablet, 1 tablet, Oral, Daily  Ocuvite-Lutein (OCUVITE - EYE VITAMIN) tab 1 tablet, 1 tablet, Oral, Daily with breakfast  spironolactone (ALDACTONE) tab 25 mg, 25 mg, Oral, Daily  aspirin EC tab 81 mg, 81 mg, Oral, Daily        Continuous Inf

## 2020-03-21 NOTE — PROGRESS NOTES
Patient seen in follow up.   Cough and shortness of breath are better   03/21/20  1321   BP: 153/49   Pulse:    Resp: 20   Temp: 98 °F (36.7 °C)       Intake/Output Summary (Last 24 hours) at 3/21/2020 1657  Last data filed at 3/21/2020 1323  Gross pe aspirin EC tab 81 mg, 81 mg, Oral, Daily      ATORVASTATIN 10 MG Oral Tab, TAKE 1 TABLET AT BEDTIME  cyanocobalamin 1000 MCG/ML Injection Solution, Inject 1 mL (1,000 mcg total) into the muscle every 30 (thirty) days.   ferrous sulfate 325 (65 FE) MG Oral T on Eliquis 2.5 mg bid   off beta blocker due to nocturnal bradycardia  rate controlled showing Aflutter on monitor currently     2. Mitral valve regurgitation -  moderate by echo 12/16  moderate by echo 1/2019     3.  Acute diastolic heart failure -  - volu

## 2020-03-21 NOTE — PLAN OF CARE
Problem: Patient Centered Care  Goal: Patient preferences are identified and integrated in the patient's plan of care  Description  Interventions:  - What would you like us to know as we care for you?  Lives at home and has 24 hour care giver coverage  - Progressing    Pt AO. PRN robitussin for cough. Denies pain. Droplet/ contact precautions maintained. Call light in reach and bed in lowest position. Will continue to monitor.

## 2020-03-21 NOTE — PLAN OF CARE
Problem: Patient Centered Care  Goal: Patient preferences are identified and integrated in the patient's plan of care  Description  Interventions:  - What would you like us to know as we care for you?  Lives at home and has 24 hour care giver coverage  - Progressing   Continued cardiac monitoring. Iv abx. Isolation precautions. Oral steroids. Weaned off oxygen. Tolerating room air. Pt/ot. Malcolm discontined. Purewick in place. Bed low, locked. Side rails up x2. Call light and belongings within reach.  Non

## 2020-03-21 NOTE — PHYSICAL THERAPY NOTE
PHYSICAL THERAPY EVALUATION - INPATIENT     Room Number: 703/386-X  Evaluation Date: 3/21/2020  Type of Evaluation: Initial   Physician Order: PT Eval and Treat    Presenting Problem: Fever and SOB , weakness with declining endurance .   per chart pt is ne Chair to from bathroom. Pt with need for max to total assist after BM for hygiene care, therapist providing care.    Pt education was provided with handouts issued including : therapy POC / fall risk prevention /energy conservation and pacing / ankle pump dyspnea, cough and wheezing.  Clinical presentation consistent with acute bronchitis.  Appears clinically improved during hospital course.   -We will start prednisone and gradually taper.  Some ongoing wheezing on examination.  Denies history of known lung Home: House   Home Layout: Two level  Stairs to Enter : (chair lift )     Stairs to Bedroom: (chair lift )       Lives With: Caregiver 24 hours(pt reports 2 cg present for 24 hours)  Drives: No  Patient Owned Equipment: Rolling walker(w/c  BSC RW   step ov room?: A Little   -   Climbing 3-5 steps with a railing?: Total     AM-PAC Score:  Raw Score: 16   Approx Degree of Impairment: 54.16%   Standardized Score (AM-PAC Scale): 40.78   CMS Modifier (G-Code): CK    FUNCTIONAL ABILITY STATUS  Gait Assessment   Ga

## 2020-03-22 LAB
ANION GAP SERPL CALC-SCNC: 8 MMOL/L (ref 0–18)
BUN BLD-MCNC: 64 MG/DL (ref 7–18)
BUN/CREAT SERPL: 50.8 (ref 10–20)
CALCIUM BLD-MCNC: 9.8 MG/DL (ref 8.5–10.1)
CHLORIDE SERPL-SCNC: 98 MMOL/L (ref 98–112)
CO2 SERPL-SCNC: 31 MMOL/L (ref 21–32)
CREAT BLD-MCNC: 1.26 MG/DL (ref 0.55–1.02)
GLUCOSE BLD-MCNC: 113 MG/DL (ref 70–99)
OSMOLALITY SERPL CALC.SUM OF ELEC: 303 MOSM/KG (ref 275–295)
POTASSIUM SERPL-SCNC: 4.1 MMOL/L (ref 3.5–5.1)
SODIUM SERPL-SCNC: 137 MMOL/L (ref 136–145)

## 2020-03-22 PROCEDURE — 99232 SBSQ HOSP IP/OBS MODERATE 35: CPT | Performed by: INTERNAL MEDICINE

## 2020-03-22 RX ORDER — ACETAMINOPHEN 325 MG/1
650 TABLET ORAL EVERY 6 HOURS PRN
Status: DISCONTINUED | OUTPATIENT
Start: 2020-03-22 | End: 2020-03-23

## 2020-03-22 NOTE — PLAN OF CARE
Problem: Patient Centered Care  Goal: Patient preferences are identified and integrated in the patient's plan of care  Description  Interventions:  - What would you like us to know as we care for you?  Lives at home and has 24 hour care giver coverage  - Progressing  Vss. Afebrile. Isolation continued. Po abx. Po steroids. Skin care. Bed low, locked. Side rails up x2. Call light and belongings within reach. Non skid socks on. Encouraged to call for assistance when needed. Pt calls appropriately.   Frequent

## 2020-03-22 NOTE — PLAN OF CARE
Problem: Patient Centered Care  Goal: Patient preferences are identified and integrated in the patient's plan of care  Description  Interventions:  - What would you like us to know as we care for you?  Lives at home and has 24 hour care giver coverage  - Progressing     Pt ambulated well tonight with 1 assist and walker. Breathing improving, satting upper 90s on RA. VSS, HR karly overnight, lowest 31, asymtomatic. Fluid restriction and purewick in place, pt voiding freely. Prn robitussin for cough.  Will co

## 2020-03-22 NOTE — PROGRESS NOTES
[facility]     Progress Note        Lena Bowen Patient Status:  Inpatient    1922 MRN [id number]   [facility] 3W/SW Attending [address], [address] Day # 5 PCP Kaley Doty MD       Subjective:   Alexia Host tablet, 1 tablet, Oral, Daily with breakfast  spironolactone (ALDACTONE) tab 25 mg, 25 mg, Oral, Daily  aspirin EC tab 81 mg, 81 mg, Oral, Daily        Continuous Infusions:     Physical Exam  Did not examine patient. Patient in bathroom.       Results:

## 2020-03-22 NOTE — PROGRESS NOTES
Patient seen in follow up. No new complaints today.   Patient was able to ambulate today with assistance   03/22/20  1613   BP: (!) 169/64   Pulse:    Resp: 18   Temp: 98.6 °F (37 °C)       Intake/Output Summary (Last 24 hours) at 3/22/2020 WILLEM Perez spironolactone (ALDACTONE) tab 25 mg, 25 mg, Oral, Daily  aspirin EC tab 81 mg, 81 mg, Oral, Daily      ATORVASTATIN 10 MG Oral Tab, TAKE 1 TABLET AT BEDTIME  cyanocobalamin 1000 MCG/ML Injection Solution, Inject 1 mL (1,000 mcg total) into the muscle ever on Eliquis 2.5 mg bid   off beta blocker due to nocturnal bradycardia  rate controlled showing Aflutter on monitor currently     2. Mitral valve regurgitation -  moderate by echo 12/16  moderate by echo 1/2019     3.  Acute diastolic heart failure -  - volu

## 2020-03-22 NOTE — PROGRESS NOTES
[facility]    Progress Note    Norberto Aid Patient Status:  Inpatient    1922 MRN [id number]   [facility] 3W/ Attending [address], [address] Day # 5 PCP Heraclio Robles MD       Subjective:   Nhung Mejia sulfate EC tab 325 mg, 325 mg, Oral, BID with meals  losartan (COZAAR) tab 100 mg, 100 mg, Oral, Daily  Isosorbide Mononitrate ER (IMDUR) 24 hr tab 60 mg, 60 mg, Oral, Daily  melatonin cap/tab 10 mg, 10 mg, Oral, Nightly  multivitamin (ADULT) tab 1 tablet, require inpatient services that will reasonably be expected to span two midnight's based on the clinical documentation in H+P. Based on patients current state of illness, I anticipate that, after discharge, patient will require TBD.         Lupe Yu

## 2020-03-23 VITALS
WEIGHT: 143.63 LBS | DIASTOLIC BLOOD PRESSURE: 55 MMHG | BODY MASS INDEX: 26.43 KG/M2 | RESPIRATION RATE: 17 BRPM | HEIGHT: 62 IN | HEART RATE: 57 BPM | OXYGEN SATURATION: 97 % | TEMPERATURE: 97 F | SYSTOLIC BLOOD PRESSURE: 118 MMHG

## 2020-03-23 PROCEDURE — 99232 SBSQ HOSP IP/OBS MODERATE 35: CPT | Performed by: INTERNAL MEDICINE

## 2020-03-23 RX ORDER — HYDRALAZINE HYDROCHLORIDE 25 MG/1
25 TABLET, FILM COATED ORAL EVERY 8 HOURS SCHEDULED
Status: DISCONTINUED | OUTPATIENT
Start: 2020-03-23 | End: 2020-03-23

## 2020-03-23 RX ORDER — FUROSEMIDE 40 MG/1
40 TABLET ORAL DAILY
Qty: 30 TABLET | Refills: 0 | Status: ON HOLD | OUTPATIENT
Start: 2020-03-24 | End: 2021-01-01

## 2020-03-23 RX ORDER — PREDNISONE 10 MG/1
TABLET ORAL
Qty: 21 TABLET | Refills: 0 | Status: ON HOLD | OUTPATIENT
Start: 2020-03-23 | End: 2021-01-01

## 2020-03-23 RX ORDER — LOSARTAN POTASSIUM 100 MG/1
100 TABLET ORAL DAILY
Status: ON HOLD | COMMUNITY
End: 2021-01-01

## 2020-03-23 RX ORDER — ASPIRIN 81 MG/1
81 TABLET ORAL DAILY
Qty: 30 TABLET | Refills: 0 | Status: ON HOLD | OUTPATIENT
Start: 2020-03-24 | End: 2021-01-01

## 2020-03-23 RX ORDER — ALBUTEROL SULFATE 90 UG/1
2 AEROSOL, METERED RESPIRATORY (INHALATION) 4 TIMES DAILY PRN
Qty: 1 INHALER | Refills: 0 | Status: ON HOLD | OUTPATIENT
Start: 2020-03-23 | End: 2021-01-01

## 2020-03-23 NOTE — PROGRESS NOTES
[facility]    Progress Note    Valentin Yuen Patient Status:  Inpatient    1922 MRN [id number]   [facility] 3W/SW Attending [address], [address] Day # 6 PCP Melissa Taveras MD       Subjective:   Tanja Noble Oral, BID  atorvastatin (LIPITOR) tab 10 mg, 10 mg, Oral, Nightly  ferrous sulfate EC tab 325 mg, 325 mg, Oral, BID with meals  losartan (COZAAR) tab 100 mg, 100 mg, Oral, Daily  Isosorbide Mononitrate ER (IMDUR) 24 hr tab 60 mg, 60 mg, Oral, Daily  melato Need for Inpatient Hospitalization - Initial Certification    Patient will require inpatient services that will reasonably be expected to span two midnight's based on the clinical documentation in H+P.    Based on patients current state of illness, I antici

## 2020-03-23 NOTE — DISCHARGE SUMMARY
[facility]    PATIENT'S NAME: Obinna Treviño   ATTENDING PHYSICIAN: Darlin Angelucci, MD   PATIENT ACCOUNT#:   [id number]    LOCATION:  [address] [place] [address] #:   [id number]       YOB: 1922  ADMISSION DATE:       03/17/202

## 2020-03-23 NOTE — PROGRESS NOTES
[facility]     Progress Note        Diamond Sheffield Patient Status:  Inpatient    1922 MRN [id number]   [facility] 3W/SW Attending [address], [address] Day # 6 PCP Artur Snow MD       Subjective:   Donna Allen 10 mg, 10 mg, Oral, Nightly  multivitamin (ADULT) tab 1 tablet, 1 tablet, Oral, Daily  Ocuvite-Lutein (OCUVITE - EYE VITAMIN) tab 1 tablet, 1 tablet, Oral, Daily with breakfast  spironolactone (ALDACTONE) tab 25 mg, 25 mg, Oral, Daily  aspirin EC tab 81 mg

## 2020-03-23 NOTE — PAYOR COMM NOTE
--------------  CONTINUED STAY REVIEW    Payor: Luz Uribe  Subscriber #:  [id number]  Authorization Number: [id number]    Admit date: 3/17/20  Admit time: 36    Admitting Physician: Jasmina Carmichael MD  Attending Physician:  [address] 60 mg     Date Action Dose Route User    3/23/2020 0853 Given 60 mg Oral Saray Pink RN      losartan (COZAAR) tab 100 mg     Date Action Dose Route User    3/23/2020 9861 Given 100 mg Oral Saray Pink RN      melatonin cap/tab 10 mg     Date Actio Net -578 ml      Wt Readings from Last 1 Encounters:  03/23/20 : 143 lb 9.6 oz (65.1 kg)        General: No acute distress. Neck: Jugular venous pulsations not seen. Lungs: Absent BS on L. Heart: Regular rhythm. No murmurs. Abdomen: Soft.   Extremitie -Patient presents with evidence of dyspnea, cough and wheezing.  Clinical presentation consistent with acute bronchitis.  Appears clinically improved during hospital course.   -We will start prednisone and gradually taper.  Some ongoing wheezing on examin

## 2020-03-23 NOTE — PHYSICAL THERAPY NOTE
PHYSICAL THERAPY TREATMENT NOTE - INPATIENT     Room Number: 658/678-X       Presenting Problem: Fever and SOB , weakness with declining endurance . per chart pt is negative for COVID-19,cardiology and pulmonary following pt .      Problem List  Principal MOBILITY  How much difficulty does the patient currently have. ..  -   Turning over in bed (including adjusting bedclothes, sheets and blankets)?: A Little   -   Sitting down on and standing up from a chair with arms (e.g., wheelchair, bedside commode, etc. preparation for discharge.    Goal #5   Current Status In progress   Goal #6    Goal #6  Current Status

## 2020-03-23 NOTE — CM/SW NOTE
Per RN rounds - pt may be ready for d/c today. SW contacted pt's dtr, Daxa Watts - she is now declining [facility] services at this time due to COVID 19.  Daxa Watts also requested that PT work w/ pt again today and provide pt w/ print outs of exercises pt will be able to

## 2020-03-23 NOTE — PROGRESS NOTES
Patient seen in follow up. No new complaints today.   Patient was able to ambulate today with assistance   03/23/20  0851   BP: (!) 164/62   Pulse:    Resp: 16   Temp: 97.4 °F (36.3 °C)       Intake/Output Summary (Last 24 hours) at 3/23/2020 1007  L Ocuvite-Lutein (OCUVITE - EYE VITAMIN) tab 1 tablet, 1 tablet, Oral, Daily with breakfast  spironolactone (ALDACTONE) tab 25 mg, 25 mg, Oral, Daily  aspirin EC tab 81 mg, 81 mg, Oral, Daily      ATORVASTATIN 10 MG Oral Tab, TAKE 1 TABLET AT BEDTIME  cyanoc 2. Mitral valve regurgitation -  moderate by echo 12/16  moderate by echo 1/2019     3.  Acute diastolic heart failure -  - volume status improved, switch lasix to 40 po QD as creat increased from 0.89 to 1.25, we will repeat BMP in AM, BNP today to check d

## 2020-03-23 NOTE — PLAN OF CARE
Problem: Patient Centered Care  Goal: Patient preferences are identified and integrated in the patient's plan of care  Description  Interventions:  - What would you like us to know as we care for you?  Lives at home and has 24 hour care giver coverage  - Completed    Patient was provided with discharge instructions, education, and follow up information. Printed prescriptions were given to patient/Prescriptions were already sent electronically to patient's pharmacy.  Patient verbalizes understanding of mishao

## 2020-03-23 NOTE — PLAN OF CARE
Iv rocephin. Fluid restriction. Okay to continue eliquis per cards. Will continue to monitor. Plan to discharge home with 24 hour supervision and home PT when medically cleared.     Problem: Patient Centered Care  Goal: Patient preferences are identified an instruct to report SOB or any respiratory difficulty  - Respiratory Therapy support as indicated  - Manage/alleviate anxiety  - Monitor for signs/symptoms of CO2 retention  Outcome: Progressing

## 2020-03-24 ENCOUNTER — PATIENT OUTREACH (OUTPATIENT)
Dept: CASE MANAGEMENT | Age: 85
End: 2020-03-24

## 2020-03-24 NOTE — PAYOR COMM NOTE
--------------  DISCHARGE REVIEW    Payor: Erik Verdugo #:  [id number]  Authorization Number: [id number]    Admit date: 3/17/20  Admit time:  7866  Discharge Date: 3/23/2020  5:40 PM DISCHARGE TO HOME    Admitting Physician: Annabelle Sheppard,

## 2020-03-25 NOTE — PROGRESS NOTES
[facility] s/w patient's daughter Salome Yanez who states that Dr. Haider Zelaya is no longer patient's PCP. She states that patient now sees Dr. Maddy Reddy. [facility] closing encounter.

## 2020-08-24 ENCOUNTER — TELEPHONE (OUTPATIENT)
Dept: CASE MANAGEMENT | Age: 85
End: 2020-08-24

## 2020-08-24 NOTE — TELEPHONE ENCOUNTER
Patient no longer sees Dr Carter Smoke. Discussed in detail w/daughter to contact insurance company to update PCP.

## 2020-11-12 NOTE — ED INITIAL ASSESSMENT (HPI)
Fever at home with cough since Wednesday. [FreeTextEntry1] : 10 yo M with history of anxiety, behavioral issues, and exacerbation of these symptoms with personality changes with agitation and sleep issues after concussion in Jan 2020 here for follow up. Was last see in Oct 2020. \par \par Interval: His mood significantly improved after starting him on Clonidine ER 0.1mg QAM. Overall his behavioral outbursts are less frequent and is able to focus in school. Also tics resolved after startiing clonidine. He has some sleep issues. He is good at drawing and spends time drawing and sleep late at night and wake up late and sometimes reaches school late. Also he has anxiety related to being alone as well as being in closes spaces. \par \par \par \par

## 2021-01-01 ENCOUNTER — HOSPITAL ENCOUNTER (INPATIENT)
Facility: HOSPITAL | Age: 86
LOS: 6 days | Discharge: HOSPICE | DRG: 291 | End: 2021-01-01
Attending: EMERGENCY MEDICINE | Admitting: INTERNAL MEDICINE
Payer: MEDICARE

## 2021-01-01 ENCOUNTER — HOSPITAL ENCOUNTER (INPATIENT)
Facility: HOSPITAL | Age: 86
LOS: 1 days | DRG: 291 | End: 2021-01-01
Attending: INTERNAL MEDICINE | Admitting: INTERNAL MEDICINE
Payer: OTHER MISCELLANEOUS

## 2021-01-01 ENCOUNTER — APPOINTMENT (OUTPATIENT)
Dept: GENERAL RADIOLOGY | Facility: HOSPITAL | Age: 86
DRG: 291 | End: 2021-01-01
Attending: EMERGENCY MEDICINE
Payer: MEDICARE

## 2021-01-01 ENCOUNTER — APPOINTMENT (OUTPATIENT)
Dept: CV DIAGNOSTICS | Facility: HOSPITAL | Age: 86
DRG: 291 | End: 2021-01-01
Attending: INTERNAL MEDICINE
Payer: MEDICARE

## 2021-01-01 ENCOUNTER — IMMUNIZATION (OUTPATIENT)
Dept: LAB | Age: 86
End: 2021-01-01
Attending: HOSPITALIST
Payer: MEDICARE

## 2021-01-01 ENCOUNTER — APPOINTMENT (OUTPATIENT)
Dept: GENERAL RADIOLOGY | Facility: HOSPITAL | Age: 86
DRG: 291 | End: 2021-01-01
Attending: INTERNAL MEDICINE
Payer: MEDICARE

## 2021-01-01 VITALS
OXYGEN SATURATION: 96 % | TEMPERATURE: 98 F | WEIGHT: 153 LBS | RESPIRATION RATE: 16 BRPM | BODY MASS INDEX: 28.16 KG/M2 | SYSTOLIC BLOOD PRESSURE: 111 MMHG | HEIGHT: 62 IN | HEART RATE: 53 BPM | DIASTOLIC BLOOD PRESSURE: 35 MMHG

## 2021-01-01 VITALS
SYSTOLIC BLOOD PRESSURE: 102 MMHG | OXYGEN SATURATION: 88 % | TEMPERATURE: 99 F | HEART RATE: 47 BPM | RESPIRATION RATE: 16 BRPM | DIASTOLIC BLOOD PRESSURE: 38 MMHG

## 2021-01-01 DIAGNOSIS — Z23 NEED FOR VACCINATION: Primary | ICD-10-CM

## 2021-01-01 DIAGNOSIS — Z23 NEED FOR VACCINATION: ICD-10-CM

## 2021-01-01 DIAGNOSIS — I50.9 ACUTE ON CHRONIC CONGESTIVE HEART FAILURE, UNSPECIFIED HEART FAILURE TYPE (HCC): Primary | ICD-10-CM

## 2021-01-01 PROCEDURE — 71045 X-RAY EXAM CHEST 1 VIEW: CPT | Performed by: INTERNAL MEDICINE

## 2021-01-01 PROCEDURE — 99232 SBSQ HOSP IP/OBS MODERATE 35: CPT | Performed by: INTERNAL MEDICINE

## 2021-01-01 PROCEDURE — 99222 1ST HOSP IP/OBS MODERATE 55: CPT | Performed by: INTERNAL MEDICINE

## 2021-01-01 PROCEDURE — 93308 TTE F-UP OR LMTD: CPT | Performed by: INTERNAL MEDICINE

## 2021-01-01 PROCEDURE — 93306 TTE W/DOPPLER COMPLETE: CPT | Performed by: INTERNAL MEDICINE

## 2021-01-01 PROCEDURE — 0002A SARSCOV2 VAC 30MCG/0.3ML IM: CPT

## 2021-01-01 PROCEDURE — 71045 X-RAY EXAM CHEST 1 VIEW: CPT | Performed by: EMERGENCY MEDICINE

## 2021-01-01 PROCEDURE — 0001A SARSCOV2 VAC 30MCG/0.3ML IM: CPT

## 2021-01-01 RX ORDER — LORAZEPAM 2 MG/ML
2 INJECTION INTRAMUSCULAR EVERY 4 HOURS PRN
Status: DISCONTINUED | OUTPATIENT
Start: 2021-01-01 | End: 2021-10-31

## 2021-01-01 RX ORDER — HALOPERIDOL 5 MG/ML
2 INJECTION INTRAMUSCULAR
Status: DISCONTINUED | OUTPATIENT
Start: 2021-01-01 | End: 2021-10-31

## 2021-01-01 RX ORDER — DEXTROSE MONOHYDRATE 25 G/50ML
50 INJECTION, SOLUTION INTRAVENOUS
Status: DISCONTINUED | OUTPATIENT
Start: 2021-01-01 | End: 2021-01-01

## 2021-01-01 RX ORDER — IPRATROPIUM BROMIDE AND ALBUTEROL SULFATE 2.5; .5 MG/3ML; MG/3ML
3 SOLUTION RESPIRATORY (INHALATION) EVERY 8 HOURS PRN
Status: CANCELLED | OUTPATIENT
Start: 2021-01-01

## 2021-01-01 RX ORDER — HYDRALAZINE HYDROCHLORIDE 50 MG/1
50 TABLET, FILM COATED ORAL EVERY 8 HOURS SCHEDULED
Status: DISCONTINUED | OUTPATIENT
Start: 2021-01-01 | End: 2021-01-01

## 2021-01-01 RX ORDER — TRAZODONE HYDROCHLORIDE 50 MG/1
100 TABLET ORAL NIGHTLY
Status: DISCONTINUED | OUTPATIENT
Start: 2021-01-01 | End: 2021-01-01

## 2021-01-01 RX ORDER — ATROPINE SULFATE 10 MG/ML
2 SOLUTION/ DROPS OPHTHALMIC EVERY 2 HOUR PRN
Status: DISCONTINUED | OUTPATIENT
Start: 2021-01-01 | End: 2021-10-31

## 2021-01-01 RX ORDER — MELATONIN
325 2 TIMES DAILY WITH MEALS
Status: DISCONTINUED | OUTPATIENT
Start: 2021-01-01 | End: 2021-01-01

## 2021-01-01 RX ORDER — CYANOCOBALAMIN 1000 UG/ML
1000 INJECTION INTRAMUSCULAR; SUBCUTANEOUS
Status: DISCONTINUED | OUTPATIENT
Start: 2021-01-01 | End: 2021-01-01

## 2021-01-01 RX ORDER — CLOTRIMAZOLE AND BETAMETHASONE DIPROPIONATE 10; .64 MG/G; MG/G
CREAM TOPICAL 2 TIMES DAILY
Status: DISCONTINUED | OUTPATIENT
Start: 2021-01-01 | End: 2021-01-01

## 2021-01-01 RX ORDER — FUROSEMIDE 40 MG/1
40 TABLET ORAL EVERY 8 HOURS PRN
Status: DISCONTINUED | OUTPATIENT
Start: 2021-01-01 | End: 2021-10-31

## 2021-01-01 RX ORDER — MORPHINE SULFATE 20 MG/ML
5 SOLUTION ORAL
Status: DISCONTINUED | OUTPATIENT
Start: 2021-01-01 | End: 2021-01-01

## 2021-01-01 RX ORDER — BISACODYL 10 MG
10 SUPPOSITORY, RECTAL RECTAL
Status: DISCONTINUED | OUTPATIENT
Start: 2021-01-01 | End: 2021-01-01

## 2021-01-01 RX ORDER — LORAZEPAM 2 MG/ML
1 INJECTION INTRAMUSCULAR EVERY 4 HOURS PRN
Status: DISCONTINUED | OUTPATIENT
Start: 2021-01-01 | End: 2021-10-31

## 2021-01-01 RX ORDER — MORPHINE SULFATE 20 MG/ML
5 SOLUTION ORAL
Status: CANCELLED | OUTPATIENT
Start: 2021-01-01

## 2021-01-01 RX ORDER — POTASSIUM CHLORIDE 750 MG/1
10 TABLET, FILM COATED, EXTENDED RELEASE ORAL DAILY
Status: ON HOLD | COMMUNITY
End: 2021-01-01

## 2021-01-01 RX ORDER — FUROSEMIDE 10 MG/ML
40 INJECTION INTRAMUSCULAR; INTRAVENOUS 3 TIMES DAILY
Status: DISCONTINUED | OUTPATIENT
Start: 2021-01-01 | End: 2021-01-01

## 2021-01-01 RX ORDER — VITS A,C,E/LUTEIN/MINERALS 300MCG-200
1 TABLET ORAL
Status: DISCONTINUED | OUTPATIENT
Start: 2021-01-01 | End: 2021-01-01

## 2021-01-01 RX ORDER — TRAZODONE HYDROCHLORIDE 100 MG/1
100 TABLET ORAL NIGHTLY
Status: ON HOLD | COMMUNITY
End: 2021-01-01

## 2021-01-01 RX ORDER — SODIUM CHLORIDE 0.9 % (FLUSH) 0.9 %
10 SYRINGE (ML) INJECTION AS NEEDED
Status: DISCONTINUED | OUTPATIENT
Start: 2021-01-01 | End: 2021-10-31

## 2021-01-01 RX ORDER — FUROSEMIDE 20 MG/1
20 TABLET ORAL DAILY
Status: DISCONTINUED | OUTPATIENT
Start: 2021-01-01 | End: 2021-01-01

## 2021-01-01 RX ORDER — TORSEMIDE 20 MG/1
20 TABLET ORAL DAILY
Status: ON HOLD | COMMUNITY
End: 2021-01-01

## 2021-01-01 RX ORDER — ISOSORBIDE MONONITRATE 60 MG/1
60 TABLET, EXTENDED RELEASE ORAL DAILY
Status: DISCONTINUED | OUTPATIENT
Start: 2021-01-01 | End: 2021-01-01

## 2021-01-01 RX ORDER — ACETAMINOPHEN 500 MG
500 TABLET ORAL 4 TIMES DAILY PRN
Status: CANCELLED | OUTPATIENT
Start: 2021-01-01

## 2021-01-01 RX ORDER — IPRATROPIUM BROMIDE AND ALBUTEROL SULFATE 2.5; .5 MG/3ML; MG/3ML
3 SOLUTION RESPIRATORY (INHALATION) EVERY 8 HOURS PRN
Status: DISCONTINUED | OUTPATIENT
Start: 2021-01-01 | End: 2021-10-31

## 2021-01-01 RX ORDER — HYDRALAZINE HYDROCHLORIDE 100 MG/1
100 TABLET, FILM COATED ORAL EVERY 8 HOURS SCHEDULED
Status: DISCONTINUED | OUTPATIENT
Start: 2021-01-01 | End: 2021-01-01

## 2021-01-01 RX ORDER — HYDRALAZINE HYDROCHLORIDE 25 MG/1
25 TABLET, FILM COATED ORAL EVERY 8 HOURS SCHEDULED
Status: DISCONTINUED | OUTPATIENT
Start: 2021-01-01 | End: 2021-01-01

## 2021-01-01 RX ORDER — SCOLOPAMINE TRANSDERMAL SYSTEM 1 MG/1
1 PATCH, EXTENDED RELEASE TRANSDERMAL
Status: DISCONTINUED | OUTPATIENT
Start: 2021-01-01 | End: 2021-10-31

## 2021-01-01 RX ORDER — MORPHINE SULFATE IN 0.9 % NACL 1 MG/ML
1 PLASTIC BAG, INJECTION (ML) INTRAVENOUS CONTINUOUS PRN
Status: DISCONTINUED | OUTPATIENT
Start: 2021-01-01 | End: 2021-10-31

## 2021-01-01 RX ORDER — BISACODYL 10 MG
10 SUPPOSITORY, RECTAL RECTAL
Status: CANCELLED | OUTPATIENT
Start: 2021-01-01

## 2021-01-01 RX ORDER — HALOPERIDOL 5 MG/ML
1 INJECTION INTRAMUSCULAR
Status: DISCONTINUED | OUTPATIENT
Start: 2021-01-01 | End: 2021-10-31

## 2021-01-01 RX ORDER — MORPHINE SULFATE 20 MG/ML
2.6 SOLUTION ORAL ONCE
Status: COMPLETED | OUTPATIENT
Start: 2021-01-01 | End: 2021-01-01

## 2021-01-01 RX ORDER — FUROSEMIDE 10 MG/ML
40 INJECTION INTRAMUSCULAR; INTRAVENOUS ONCE
Status: COMPLETED | OUTPATIENT
Start: 2021-01-01 | End: 2021-01-01

## 2021-01-01 RX ORDER — LORAZEPAM 2 MG/ML
0.5 INJECTION INTRAMUSCULAR EVERY 4 HOURS PRN
Status: DISCONTINUED | OUTPATIENT
Start: 2021-01-01 | End: 2021-10-31

## 2021-01-01 RX ORDER — MORPHINE SULFATE 2 MG/ML
1 INJECTION, SOLUTION INTRAMUSCULAR; INTRAVENOUS
Status: DISCONTINUED | OUTPATIENT
Start: 2021-01-01 | End: 2021-10-31

## 2021-01-01 RX ORDER — ONDANSETRON 2 MG/ML
4 INJECTION INTRAMUSCULAR; INTRAVENOUS 4 TIMES DAILY
Status: DISCONTINUED | OUTPATIENT
Start: 2021-01-01 | End: 2021-10-31

## 2021-01-01 RX ORDER — HYDRALAZINE HYDROCHLORIDE 50 MG/1
50 TABLET, FILM COATED ORAL ONCE
Status: COMPLETED | OUTPATIENT
Start: 2021-01-01 | End: 2021-01-01

## 2021-01-01 RX ORDER — ACETAMINOPHEN 500 MG
500 TABLET ORAL 4 TIMES DAILY PRN
Status: DISCONTINUED | OUTPATIENT
Start: 2021-01-01 | End: 2021-01-01

## 2021-01-01 RX ORDER — MORPHINE SULFATE 20 MG/ML
5 SOLUTION ORAL
Status: DISCONTINUED | OUTPATIENT
Start: 2021-01-01 | End: 2021-10-31

## 2021-01-01 RX ORDER — CLOTRIMAZOLE AND BETAMETHASONE DIPROPIONATE 10; .64 MG/G; MG/G
CREAM TOPICAL 2 TIMES DAILY
Status: CANCELLED | OUTPATIENT
Start: 2021-01-01

## 2021-01-01 RX ORDER — MORPHINE SULFATE 20 MG/ML
2.5 SOLUTION ORAL
Status: DISCONTINUED | OUTPATIENT
Start: 2021-01-01 | End: 2021-01-01

## 2021-01-01 RX ORDER — CLOTRIMAZOLE AND BETAMETHASONE DIPROPIONATE 10; .64 MG/G; MG/G
CREAM TOPICAL 2 TIMES DAILY
Status: DISCONTINUED | OUTPATIENT
Start: 2021-01-01 | End: 2021-10-31

## 2021-01-01 RX ORDER — SENNA AND DOCUSATE SODIUM 50; 8.6 MG/1; MG/1
1 TABLET, FILM COATED ORAL DAILY
Status: DISCONTINUED | OUTPATIENT
Start: 2021-01-01 | End: 2021-01-01

## 2021-01-01 RX ORDER — POTASSIUM CHLORIDE 750 MG/1
10 TABLET, EXTENDED RELEASE ORAL DAILY
Status: DISCONTINUED | OUTPATIENT
Start: 2021-01-01 | End: 2021-01-01

## 2021-01-01 RX ORDER — FUROSEMIDE 10 MG/ML
40 INJECTION INTRAMUSCULAR; INTRAVENOUS DAILY
Status: DISCONTINUED | OUTPATIENT
Start: 2021-01-01 | End: 2021-01-01

## 2021-01-01 RX ORDER — ACETAMINOPHEN 500 MG
500 TABLET ORAL 4 TIMES DAILY PRN
Status: DISCONTINUED | OUTPATIENT
Start: 2021-01-01 | End: 2021-10-31

## 2021-01-01 RX ORDER — GLYCOPYRROLATE 0.2 MG/ML
0.4 INJECTION, SOLUTION INTRAMUSCULAR; INTRAVENOUS
Status: DISCONTINUED | OUTPATIENT
Start: 2021-01-01 | End: 2021-10-31

## 2021-01-01 RX ORDER — LOSARTAN POTASSIUM 100 MG/1
100 TABLET ORAL DAILY
Status: DISCONTINUED | OUTPATIENT
Start: 2021-01-01 | End: 2021-01-01

## 2021-01-01 RX ORDER — ONDANSETRON 2 MG/ML
4 INJECTION INTRAMUSCULAR; INTRAVENOUS 4 TIMES DAILY
Status: DISCONTINUED | OUTPATIENT
Start: 2021-01-01 | End: 2021-01-01

## 2021-01-01 RX ORDER — IPRATROPIUM BROMIDE AND ALBUTEROL SULFATE 2.5; .5 MG/3ML; MG/3ML
3 SOLUTION RESPIRATORY (INHALATION) EVERY 8 HOURS PRN
Status: DISCONTINUED | OUTPATIENT
Start: 2021-01-01 | End: 2021-01-01

## 2021-01-01 RX ORDER — BISACODYL 10 MG
10 SUPPOSITORY, RECTAL RECTAL
Status: DISCONTINUED | OUTPATIENT
Start: 2021-01-01 | End: 2021-10-31

## 2021-01-01 RX ORDER — ONDANSETRON 2 MG/ML
4 INJECTION INTRAMUSCULAR; INTRAVENOUS 4 TIMES DAILY
Status: CANCELLED | OUTPATIENT
Start: 2021-01-01

## 2021-01-01 RX ORDER — TORSEMIDE 20 MG/1
20 TABLET ORAL DAILY
Status: DISCONTINUED | OUTPATIENT
Start: 2021-01-01 | End: 2021-01-01

## 2021-01-01 RX ORDER — DOXEPIN HYDROCHLORIDE 50 MG/1
1 CAPSULE ORAL DAILY
Status: DISCONTINUED | OUTPATIENT
Start: 2021-01-01 | End: 2021-01-01

## 2021-01-01 RX ORDER — FUROSEMIDE 10 MG/ML
40 INJECTION INTRAMUSCULAR; INTRAVENOUS EVERY 8 HOURS PRN
Status: DISCONTINUED | OUTPATIENT
Start: 2021-01-01 | End: 2021-10-31

## 2021-04-30 NOTE — TELEPHONE ENCOUNTER
Tamra Smith---daughter  Re: Juana Cools  Having a lot of nasal and head congestion.       [facility]

## 2021-10-23 NOTE — ED INITIAL ASSESSMENT (HPI)
The patient who has a history of CHF reports increased shortness of breath especially with exertion over last couple of day. The patient reports that she vomited after breakfast but ate lunch without vomiting and no nausea reported currently.  The patient d

## 2021-10-23 NOTE — ED QUICK NOTES
Orders for admission, patient is aware of plan and ready to go upstairs. Any questions, please call ED RN Dat Delgado  at extension [phone].    Type of COVID test sent: Rapid  COVID Suspicion level: Negative    Titratable drug(s) infusing: NA  Rate:    LOC at t

## 2021-10-23 NOTE — ED PROVIDER NOTES
Patient Seen in: [facility] Emergency Department      History   Patient presents with:  Difficulty Breathing    Stated Complaint: sob    Subjective:   HPI    The patient is a 77-year-old female with a history of hypertension, hyperlipidemia, CHF, as noted in HPI. Constitutional and vital signs reviewed. All other systems reviewed and negative except as noted above.     Physical Exam     ED Triage Vitals [10/23/21 1628]   BP (!) 169/60   Pulse 50   Resp 20   Temp 98.1 °F (36.7 °C)   Temp src Te -American 44 (*)     All other components within normal limits   PRO BETA NATRIURETIC PEPTIDE - Abnormal; Notable for the following components:    Pro-Beta Natriuretic Peptide 13,244 (*)     All other components within normal limits   CBC W/ DIFFERE Disposition:  Admit  10/23/2021  6:57 pm    Follow-up:  No follow-up provider specified.   We recommend that you schedule follow up care with a primary care provider within the next three months to obtain basic health screening including reassessment of

## 2021-10-24 NOTE — H&P
[facility]    HISTORY AND PHYSICAL EXAMINATION    Zoila Daija Patient Status:  Inpatient    1922 MRN [id number]   Location [facility] 3W/SW Attending Talia Blanchard MD   [facility] Day # 1 PCP Ina Nair MD, MD     Patient seen Rudi and daughter Paula Zheng. Reports on results of clinical lab and imaging studies done in ER 10/23/2021.     ASSESSMENT AND PLAN   * = Admitting/principal diagnos(i/e)s:     Cardiovascular  · *  CHF - Acute exacerbation chronic heart failure.  Diastolic, multi

## 2021-10-24 NOTE — PLAN OF CARE
Patient admitted from emergency room with complaint of shortness of breath. Diuresing well on IV lasix. Elevated BP on admission. Afib down to 30-40's while asleep, 40-50's while awake but asymptomatic.  O2 4-5L NC tonight for shortness of breath and SPO2 8 Term Goal  Description: Patient's Short Term Goal: to breathe better    Interventions:   - wean off oxygen  - increase activity as tolerated  - administer medications as ordered  - See additional Care Plan goals for specific interventions  Outcome: Not Pro

## 2021-10-24 NOTE — H&P
Patient seen and examined face-to-face in person in her room 317 at [facility] on 10/24/2021. Chart reviewed. Discussed with ER Dr. Merlyn Lira, [address] [facility] admitting nurse Nuzhat Moreno nurse Rudi and daughter Princess Carroll.     24-year-old lady with shortness of breat

## 2021-10-24 NOTE — PLAN OF CARE
Patient has episode of shortness of breath, O2 sats remain 95% on 4L, reposition patient, encourage to breath through nose slowly. Also encourage patient to not lay flat and to sit up as high as possible.  Lasix 40mg TID, BP high, increased hydralazine to 1 hematoma  - Assess quality of pulses, skin color and temperature  - Assess for signs of decreased coronary artery perfusion - ex.  Angina  - Evaluate fluid balance, assess for edema, trend weights  Outcome: Progressing  Goal: Absence of cardiac arrhythmias coordinating discharge planning if the patient needs post-hospital services based on physician/LIP order or complex needs related to functional status, cognitive ability or social support system  Outcome: Progressing

## 2021-10-24 NOTE — CONSULTS
Cardiology Consult Note:    HPI: 80year old female, with afib, HFpEF, htn, hlp, pulm htn who is presenting to the hospital with shortness of breath.       Meds adjustment for CHF was made by Dr. Rosina Lawler as an outpatient, however, patient has continued to pr Oral, BID  isosorbide mononitrate ER (IMDUR) 24 hr tab 60 mg, 60 mg, Oral, Daily  Senna-Docusate Sodium (SENOKOT S) 8.6-50 MG tab 1 tablet, 1 tablet, Oral, Daily  melatonin cap/tab 10 mg, 10 mg, Oral, Nightly  multivitamin (ADULT) tab 1 tablet, 1 tablet, O breastfeeding.     Scheduled Meds:   • hydrALAZINE  100 mg Oral Q8H [address]   • clotrimazole-betamethasone   Topical BID   • Ocuvite-Lutein  1 tablet Oral Daily with breakfast   • apixaban  2.5 mg Oral BID   • isosorbide mononitrate ER  60 mg Oral Daily   • Nargis De La Garza calcified annulus. Trileaflet; normal      thickened, mildly calcified leaflets. There is sclerosis without      stenosis. Peak velocity (S): 1.96m/sec. Mean gradient (S): 8mm      Hg. Peak gradient (S): 15mm Hg.  Valve area (VTI): 1.43cm^2.      Valve area

## 2021-10-25 NOTE — PLAN OF CARE
Problem: Patient Centered Care  Goal: Patient preferences are identified and integrated in the patient's plan of care  Description: Interventions:  - What would you like us to know as we care for you? I live at home with two full time care givers.   - Pro INTERVENTIONS:  - Continuous cardiac monitoring, monitor vital signs, obtain 12 lead EKG if indicated  - Evaluate effectiveness of antiarrhythmic and heart rate control medications as ordered  - Initiate emergency measures for life threatening arrhythmias oxygenation  Description: INTERVENTIONS:  - Assess for changes in respiratory status  - Assess for changes in mentation and behavior  - Position to facilitate oxygenation and minimize respiratory effort  - Oxygen supplementation based on oxygen saturation

## 2021-10-25 NOTE — PLAN OF CARE
[place] [facility]   [facility]  [place] [address]  Phone: [phone]  Fax: [phone]                  Kirk Perez III   3/20/2017 10:40 AM   Office Visit    Description:  Male : 1974   Provider:  Ryann Madrigal MD   Department:  [place] [facility]           Reason for Visit     Transitional Care           Diagnoses this Visit        Comments    Essential hypertension    -  Primary     Abdominal pain, unspecified location                To Do List           Future Appointments        Provider Department Dept Phone    2017 8:30 AM BERNARDA Shirley [facility] - Gastroenterology [phone]      Goals (5 Years of Data)     None      Follow-Up and Disposition     Return in about 6 months (around 2017).       These Medications        Disp Refills Start End    duloxetine (CYMBALTA) 60 MG capsule 30 capsule 11 3/20/2017 3/20/2018    Take 1 capsule (60 mg total) by mouth once daily. - Oral    Pharmacy: [facility] [address] [place] [address] [facility] Ph #: [phone]       clonazePAM (KLONOPIN) 0.5 MG tablet 60 tablet 5 3/20/2017     Take 1 tablet (0.5 mg total) by mouth 2 (two) times daily. - Oral    Pharmacy: [facility] [address] [place] [address] [address] [facility] Ph #: [phone]         [facility] On Call     [facility] On Call Nurse Care Line -  Assistance  Registered nurses in the [facility] provide clinical advisement, health education, appointment booking, and other advisory services.  Call for this free service at [phone].             Medications           Message regarding Medications     Verify the changes and/or additions to your medication regime listed below are the same as discussed with your clinician today.  If any of these changes or additions are incorrect, please notify your healthcare provider.        START  Patient slept throughout most of the night. On 4 L nasal cannula. SpO2 98%. Patient had episode of SOB. IV lasix given early. Patient sitting up in bed and breathing in through nose slowly. HR sustaining 30s-40 while asleep. HR down to 28 briefly.  Javier Buitrago "taking these NEW medications        Refills    duloxetine (CYMBALTA) 60 MG capsule 11    Sig: Take 1 capsule (60 mg total) by mouth once daily.    Class: Normal    Route: Oral           Verify that the below list of medications is an accurate representation of the medications you are currently taking.  If none reported, the list may be blank. If incorrect, please contact your healthcare provider. Carry this list with you in case of emergency.           Current Medications     amlodipine (NORVASC) 10 MG tablet Take 1 tablet (10 mg total) by mouth once daily.    clonazePAM (KLONOPIN) 0.5 MG tablet Take 1 tablet (0.5 mg total) by mouth 2 (two) times daily.    dicyclomine (BENTYL) 10 MG capsule Take 1 capsule (10 mg total) by mouth before meals as needed (TID PRN). For abdominal pain    oxycodone-acetaminophen (PERCOCET) 5-325 mg per tablet Take 1 tablet by mouth every 4 (four) hours as needed for Pain. Please take one or two tablets as needed for pain    senna (SENNA CONCENTRATE) 8.6 mg tablet Take 1 tablet by mouth once daily.    duloxetine (CYMBALTA) 60 MG capsule Take 1 capsule (60 mg total) by mouth once daily.           Clinical Reference Information           Your Vitals Were     BP Pulse Temp Height Weight BMI    140/86 96 98.7 °F (37.1 °C) 5' 4.5" (1.638 m) 92 kg (202 lb 13.2 oz) 34.28 kg/m2      Blood Pressure          Most Recent Value    BP  (!)  140/86      Allergies as of 3/20/2017     Losartan      Immunizations Administered on Date of Encounter - 3/20/2017     None      [facility] Sign-Up     Activating your [facility] account is as easy as 1-2-3!     1) Visit [email or web address], select Sign Up Now, enter this activation code and your date of birth, then select Next.  [email or web address]  Expires: 3/23/2017  5:21 PM      2) Create a username and password to use when you visit [facility] in the future and select a security question in case you lose your password and select Next.    3) Enter your e-mail address and " ADULT  Goal: Maintains optimal cardiac output and hemodynamic stability  Description: INTERVENTIONS:  - Monitor vital signs, rhythm, and trends  - Monitor for bleeding, hypotension and signs of decreased cardiac output  - Evaluate effectiveness of vasoacti appropriate resources  Description: INTERVENTIONS:  - Identify barriers to discharge w/pt and caregiver  - Include patient/family/discharge partner in discharge planning  - Arrange for needed discharge resources and transportation as appropriate  - Identif click Sign Up!    Additional Information  If you have questions, please e-mail [email or web address] or call [phone] to talk to our [facility] staff. Remember, [facility] is NOT to be used for urgent needs. For medical emergencies, dial 911.         Language Assistance Services     ATTENTION: Language assistance services are available, free of charge. Please call [phone].      ATENCIÓN: Si habla español, tiene a garcia disposición servicios gratuitos de asistencia lingüística. Llame al [phone].     CHÚ Ý: N?u b?n nói Ti?ng Vi?t, có các d?ch v? h? tr? ngôn ng? mi?n phí dành cho b?n. G?i s? [phone].         [place] [facility] complies with applicable Federal civil rights laws and does not discriminate on the basis of race, color, national origin, age, disability, or sex.

## 2021-10-25 NOTE — CM/SW NOTE
10/25/21 1000   CM/SW Referral Data   Referral Source Social Work (self-referral)   Reason for Referral Discharge planning   Informant Patient   Pertinent Medical Hx   Does patient have an established PCP?  Yes  Abena Sawant)   Patient Info   Patient's H

## 2021-10-25 NOTE — PROGRESS NOTES
[facility]    Cardiology JESSICA DEJESUS ELIZABETH - HUMACAO Progress Note       Loretta Bishop Patient Status:  Inpatient    1922 MRN [id number]   Location [facility] 3W/SW Attending Swathi Noble MD   [facility] Day # 2 PCP Winston Johnson MD, MD       Allyson Sarmiento tract infection)        Past Surgical History:  Past Surgical History:   Procedure Laterality Date   • DRAIN/INJECT LARGE JOINT/BURSA     • HEMORRHOIDECTOMY     • HYSTERECTOMY         Family History:  Family History   Problem Relation Age of Onset   • Canc Oral Tab, Take 10 mg by mouth nightly. Multiple Vitamin (THERA) Oral Tab, Take 1 tablet by mouth daily. ISOSORBIDE MONONITRATE ER 60 MG Oral Tablet 24 Hr, TAKE 1 TABLET EVERY DAY  AmLODIPine Besylate 10 MG Oral Tab, Take 5 mg by mouth daily.     apixaban sternal border. Lungs: Decreased air entry bilaterally  Abdomen: Soft, non-tender. No abdominal bruit. No hepatojugular reflux. Extremities: Mild leg edema is noted more left than right. Neurologic: Alert and moving all 4 extremities.   Psychiatry: Radha Burgos is 1.6.  -Echocardiogram performed today was reviewed. No significant change from before.   Patient has preserved systolic function with mild left ventricular hypertrophy and moderate eccentric mitral regurgitation associated with severe left atrial enlarg

## 2021-10-25 NOTE — PAYOR COMM NOTE
--------------  ADMISSION REVIEW     Payor: Edyta Balderas  Subscriber #:  [id number]  Authorization Number: [id number]    Admit date: 10/23/21  Admit time:  8:28 PM       REVIEW DOCUMENTATION:     ED Provider Notes      ED Provider Notes signed by Shantel Castanon cuff tear    • Sciatica    • Sinusitis    • Tendonitis     Right arm   • Unspecified essential hypertension    • UTI (urinary tract infection)               Past Surgical History:   Procedure Laterality Date   • DRAIN/INJECT LARGE JOINT/BURSA     • [facility] person, place, and time. Normal reflexes. No cranial nerve deficit. No motor os sensory defecits noted Coordination normal.   Skin: Skin is warm and dry. Psychiatric: Normal mood and affect.  Behavior is normal. Judgment and thought content normal.   Nurs basilar pleural effusion. Dictated by (CST): Hellen Martinez MD on 10/23/2021 at 5:27 PM     Finalized by (CST): Hellen Martinez MD on 10/23/2021 at 5:29 PM                  MDM      Patient remains comfortable on 2 L nasal cannula. Lasix given.   Will sp of breath have both returned this morning. Repeat IV Lasix ordered. Hydralazine increased. Awaiting echocardiogram and consultation from cardiology Dr. Corky Lay.      Electronically signed by Rosanne Eastman MD on 10/25/2021 10:49 AM         MEDICATIONS ADMINIS 10 mEq     Date Action Dose Route User    10/25/2021 0937 Given 10 mEq Oral Jose Kimble RN      Senna-Docusate Sodium (SENOKOT S) 8.6-50 MG tab 1 tablet     Date Action Dose Route User    10/25/2021 9770 Given 1 tablet Oral Jose Kimble RN This morning with worsening of dyspnea. Was given additional dose of lasix with mild improvement in symptoms.     Physical Exam:   Blood pressure (!) 182/69, pulse 50, temperature 97.8 °F (36.6 °C), temperature source Oral, resp.  rate 22, weight 149 lb breastfeeding. Intake/Output:                Last 3 shifts: [id number]@               This shift: I/O this shift:  In: 120 [P.O.:120]    Lungs: Decreased air entry bilaterally  Abdomen: Soft, non-tender. No abdominal bruit. No hepatojugular reflux.   Ex severe left atrial enlargement and pulmonary hypertension causing right-sided failure with right atrial and right ventricular moderate enlargement.  -Small to moderate pedicle effusion is noted which is not change from before     Continue conservative medi push daily   4. History of hypertension   -Blood pressure stable at 137/52. Heart rate is 59/min. .  - continue hydralazine/imdur/losartan  -Clinically appears well compensated        5.  CAD with hx of NSTEMI  - on  Isosorbide  - at age of 80, benefit of

## 2021-10-26 NOTE — PROGRESS NOTES
[facility]    Cardiology JESSICA DEJESUS ELIZABETH - [facility]ACAO Progress Note       Norberto Aid Patient Status:  Inpatient    1922 MRN [id number]   Location [facility] 3W/SW Attending Petra Cooper MD   [address] Day # 3 PCP Maria Elena Mendes MD, MD       Laretta Runner Tendonitis     Right arm   • Unspecified essential hypertension    • UTI (urinary tract infection)        Past Surgical History:  Past Surgical History:   Procedure Laterality Date   • DRAIN/INJECT LARGE JOINT/BURSA     • HEMORRHOIDECTOMY     • HYSTERECTOM mg total) by mouth 2 (two) times daily with meals. Senna-Docusate Sodium 8.6-50 MG Oral Tab, Take 1 tablet by mouth daily. Melatonin 10 MG Oral Tab, Take 10 mg by mouth nightly. Multiple Vitamin (THERA) Oral Tab, Take 1 tablet by mouth daily.   ISOSORBID mucosa is moist.  Neck: No JVD, no bruits. Cardiac: Normal rate, grade 2/6 systolic murmur noted on the left sternal border. Lungs: Decreased air entry bilaterally. Mild rhonchi noted bilaterally  Abdomen: Soft, non-tender. No abdominal bruit.  No hepato push daily   4. History of hypertension   -Blood pressure stable at 137/52. Heart rate is 59/min. .  - continue hydralazine/imdur/losartan  -Clinically appears well compensated        5.  CAD with hx of NSTEMI  - on  Isosorbide  - at age of 80, benefit of

## 2021-10-27 NOTE — PROGRESS NOTES
[place] [facility]    PROGRESS NOTE    Stuart Curran Patient Status:  Inpatient    1922 MRN [id number]   Location [facility] 3W/SW Attending Alli Eastman MD   [facility] Day # 4 PCP Lola Dutton MD, MD     Patient seen and examined face-t least as much due to lung as heart disease. Probably pulmonary HTN due to fibrosis. Supplemental O2 in case of hypoxemia. May start morphine.   · CHF - Acute exacerbation chronic heart failure.  Diastolic, multifactorial, including systemic and pulmonary

## 2021-10-27 NOTE — PROGRESS NOTES
Patient seen in follow up. No reported chest pain or sob. No new complaints. Chart reviewed. Review of systems: Constitutional: Negative for chills. Respiratory: Negative for shortness of breath or cough.    Cardiac: Negative for chest pain or tight mg, Oral, BID with meals  losartan (COZAAR) tab 100 mg, 100 mg, Oral, Daily  potassium chloride (K-DUR) CR tab 10 mEq, 10 mEq, Oral, Daily  traZODone (DESYREL) tab 100 mg, 100 mg, Oral, Nightly      torsemide 20 MG Oral Tab, Take 20 mg by mouth daily.   Pot blocker due to nocturnal bradycardia  rate controlled showing Aflutter on monitor currently     2.  Mitral valve regurgitation -  Moderate by echo 12/16  Moderate by echo 1/2019  -Moderate eccentric mitral regurgitation going to the posterior wall  10/25/20

## 2021-10-27 NOTE — PROGRESS NOTES
[place] [facility] - [facility]    PROGRESS NOTE    Lena Bowen Patient Status:  Inpatient    1922 MRN [id number]   Location [facility] 3W/SW Attending Jony Crook MD   [facility] Day # 3 PCP Melissa Forbes MD, MD     Patient seen and examined face-t regurgitation, compounded by atrial fibrillation with loss of atrial systole. Edema (not dyspnea) resolved with diuresis. Continue consultation with [facility].   · HTN - Chronic, complicated by hypertensive heart disease and diastolic dysfunction a

## 2021-10-27 NOTE — PLAN OF CARE
Pt A&Ox4. On 3L O2. IV lasix daily. Purewick intact. No complaints of pain. Plan for echo w/ bubbles today. Pt SOB w/ exertion. Call light within reach. Bed alarm on.      Problem: Patient Centered Care  Goal: Patient preferences are identified and integrat

## 2021-10-27 NOTE — CM/SW NOTE
Per RN rounds, pt remains on 3L O2 at this time. Pt may require home O2 at time of DC.     SW left Vmail for liaison Consuelo Zavaleta w/ [facility] and initiated referral. Update: received call back from Consuelo Zavaleta confirming she received and is aware of new referral.    Roseann Hilliard

## 2021-10-27 NOTE — CONSULTS
[facility]    Report of Consultation    Tamar Prieto Patient Status:  Inpatient    1922 MRN [id number]   Location [facility] 3W/SW Attending Maribel Davies MD   [facility] Day # 4 PCP Brunilda Mcdonald MD, MD     Date of Admission:  10/ History    Socioeconomic History      Marital status:      Tobacco Use      Smoking status: Never Smoker      Smokeless tobacco: Never Used    Vaping Use      Vaping Use: Never used    Substance and Sexual Activity      Alcohol use: No      Drug use: nightly. Multiple Vitamin (THERA) Oral Tab, Take 1 tablet by mouth daily. ISOSORBIDE MONONITRATE ER 60 MG Oral Tablet 24 Hr, TAKE 1 TABLET EVERY DAY  AmLODIPine Besylate 10 MG Oral Tab, Take 5 mg by mouth daily.     apixaban (ELIQUIS) 2.5 MG Oral Tab, Yamile Pryor 26.0 10/27/2021     (H) 10/27/2021    CA 8.8 10/27/2021    ALB 4.2 06/25/2018    ALKPHO 94 06/25/2018    TP 6.8 06/25/2018    AST 14 06/25/2018    ALT 11 06/25/2018    INR 1.4 (H) 11/04/2018    PTP 16.8 (H) 11/04/2018    TSH 3.48 11/26/2016    MG 2.

## 2021-10-28 NOTE — PROGRESS NOTES
[place] [facility] - [facility]     Progress Note        Lena Bowen Patient Status:  Inpatient    1922 MRN [id number]   Location [facility] 3W/SW Attending Jony Crook MD   Hosp Day # 5 PCP Melissa Forbes MD, MD       Subjective:   Patient see Daily  potassium chloride (K-DUR) CR tab 10 mEq, 10 mEq, Oral, Daily  traZODone (DESYREL) tab 100 mg, 100 mg, Oral, Nightly        Continuous Infusions:     Physical Exam  Constitutional: no acute distress  Eyes: PERRL  ENT: nares pateint  Neck: supple, no

## 2021-10-28 NOTE — PROGRESS NOTES
Documentation for O2 sats: (RN to add to progress note)  Patient's O2 sat on room air is 87% at rest. Pt's O2 sat on room is 77% when ambulating, and 90% on 3 liter while ambulating.

## 2021-10-28 NOTE — PLAN OF CARE
3L of O2, PO lasix dc'd per cards. Purewick in place. Nebs PRN. Family at bedside. Plan for home tomorrow.    Problem: Patient Centered Care  Goal: Patient preferences are identified and integrated in the patient's plan of care  Description: Interventions: weights  Outcome: Progressing  Goal: Absence of cardiac arrhythmias or at baseline  Description: INTERVENTIONS:  - Continuous cardiac monitoring, monitor vital signs, obtain 12 lead EKG if indicated  - Evaluate effectiveness of antiarrhythmic and heart rat system  Outcome: Progressing     Problem: RESPIRATORY - ADULT  Goal: Achieves optimal ventilation and oxygenation  Description: INTERVENTIONS:  - Assess for changes in respiratory status  - Assess for changes in mentation and behavior  - Position to facili

## 2021-10-28 NOTE — CM/SW NOTE
MD will need to document the following in note, please copy & paste, fill in parentheses:     On (00/00/0000) I had a face to face encounter with (First, Last Name) for a semi electric  hospital bed medical necessity evaluation.  Patient has a history of (D

## 2021-10-28 NOTE — CM/SW NOTE
12: 20PM  Received notice from pt's RN/Saray - O2 sats completed and in Epic. SW sent O2 sats to E via Aidin. RADHA entered for home O2 - pending MD miller. 02:20PM  Received call from Dr. Brent brian/ pt's son to discuss DC Plan.     SW confirmed pt to remain available for support and/or discharge planning.          Mary Carmen Boone, [address]

## 2021-10-28 NOTE — PLAN OF CARE
Pt a&Ox4. On 3L NC. Weak cough. States pain, Prn tylenol given. Scheduled IV zofran continued. Tolerating soft easy to chew diet, thin liquids. Purewick in place, low urine output. Bladder scan as needed. BLE edema improve. Generalized bruising.  Up with 1- bleeding, hypotension and signs of decreased cardiac output  - Evaluate effectiveness of vasoactive medications to optimize hemodynamic stability  - Monitor arterial and/or venous puncture sites for bleeding and/or hematoma  - Assess quality of pulses, ski post-discharge preferences of patient/family/discharge partner  - Complete POLST form as appropriate  - Assess patient's ability to be responsible for managing their own health  - Refer to Case Management Department for coordinating discharge planning if t

## 2021-10-28 NOTE — PLAN OF CARE
Pt A&Ox4 on 3L O2. Zofran given for nausea. Pt repositioned q2hr and prn. Purewick intact. Continue IV lasix. Bed alarm on. Call light within reach. Frequent rounding performed.      Problem: Patient Centered Care  Goal: Patient preferences are identified a

## 2021-10-29 PROBLEM — I25.10 CORONARY ARTERY DISEASE INVOLVING NATIVE CORONARY ARTERY OF NATIVE HEART: Status: ACTIVE | Noted: 2021-01-01

## 2021-10-29 PROBLEM — I50.9 CHF (CONGESTIVE HEART FAILURE) (HCC): Status: ACTIVE | Noted: 2020-03-17

## 2021-10-29 PROBLEM — I27.20 PULMONARY HYPERTENSION (HCC): Status: ACTIVE | Noted: 2021-01-01

## 2021-10-29 NOTE — PROGRESS NOTES
Patient seen in follow up. No reported chest pain. Baseline sob. No new complaints. Chart reviewed. Review of systems: Constitutional: Negative for chills. Respiratory: Negative for shortness of breath or cough.    Cardiac: Negative for chest pain o Nightly  multivitamin (ADULT) tab 1 tablet, 1 tablet, Oral, Daily  ferrous sulfate EC tab 325 mg, 325 mg, Oral, BID with meals  losartan (COZAAR) tab 100 mg, 100 mg, Oral, Daily  potassium chloride (K-DUR) CR tab 10 mEq, 10 mEq, Oral, Daily  traZODone (LES -  - volume overload on exam  - EF 60-65%   -No improvement in shortness of breath despite IV Lasix. 4.  History of hypertension   -on hydralazine/imdur/losartan  -Clinically appears well compensated        5.  CAD with hx of NSTEMI  - on  Isosorbide

## 2021-10-29 NOTE — CM/SW NOTE
MD will need to document the following in note, please copy & paste, fill in parentheses:     On (00/00/0000) I had a face to face encounter with (First, Last Name) for a semi electric  hospital bed medical necessity evaluation.  Patient has a history of (D caregiver who is available, willing and able to provide assistance with the wheelchair. MD please copy/paste above into progress note.     Gloria Mejia, [address]

## 2021-10-29 NOTE — CM/SW NOTE
[facility] received referral and sent Aidin for hospital staff. We will follow up about services.   Thank you

## 2021-10-29 NOTE — PROGRESS NOTES
10/29/21 1300   Clinical Encounter Type   Visited With Health care provider   Latter-day Encounters   Spiritual Requests During Visit / Hospitalization Sacrament of the Sick     PHILLIP Romero advised that pts family would like anointing of the sick.  Confirme

## 2021-10-29 NOTE — SPIRITUAL CARE NOTE
initiated Pastoral Visit. Pt was lying in bed with eyes closed. Pt room was bright and peaceful. Pt was surrounded by a host of family members. Pt is a 80year old female, admitted today to Hospice.   Pt shows as of 10/23/21 - Patient presents w

## 2021-10-29 NOTE — PLAN OF CARE
On 3L NC, continues with weak cough. PRN tylenol given for leg/hip pain, frequent repositioning with multiple pillows and blankets. Scheduled IV zofran continued. April Anabella in place, low urine output, MD aware.      Problem: CARDIOVASCULAR - ADULT  Goal: Liz Petty with appropriate resources  Description: INTERVENTIONS:  - Identify barriers to discharge w/pt and caregiver  - Include patient/family/discharge partner in discharge planning  - Arrange for needed discharge resources and transportation as appropriate  - Id

## 2021-10-29 NOTE — PLAN OF CARE
Initial plan for today was to discharge home with home health and home hospital equipment, plan changed to inpatient hospice evaluation, patient accepted for inpatient hospice, continuing prn pain medication, 3L oxygen, turning for comfort, maria dolores, plan

## 2021-10-29 NOTE — PROGRESS NOTES
Patient seen in follow up. Patient reports that she feels SOB is slightly worse today. Patient denies chest pain, dizziness, palpitations, and BLE edema.  On 3 L of O2 via NC. ccu      10/28/21  2156   BP: 105/50   Pulse: 58   Resp: 16   Temp: 97.5 °F tab 10 mEq, 10 mEq, Oral, Daily  traZODone (DESYREL) tab 100 mg, 100 mg, Oral, Nightly      torsemide 20 MG Oral Tab, Take 20 mg by mouth daily. Potassium Chloride ER 10 MEQ Oral Tab CR, Take 10 mEq by mouth daily.   traZODone 100 MG Oral Tab, Take 100 mg compensated        5. CAD with hx of NSTEMI  - on  Isosorbide     PLAN:  - Lasix stopped yesterday due to hyponatremia and MARCE suspected 2/2 overdiureses   - On physical exam, no significant edema. Still dyspneic despite diuresis.    - on repeat labs worsen

## 2021-10-29 NOTE — CM/SW NOTE
09: 35AM  SW paged Dr. Jeremías Eddy via answering sytem this AM.    VIRGINIE received message from Dr. Jeremías Eddy via Epic secure chat confirming he needs to cosign 2 MDO's and complete MD verbiage for hospital bed by 12pm today for same day delivery.     SW checked pt's ch Lexi Kumar [facility] - they will meet w/ pt and family around [id number] [place] [address].    Received call from Memolane and provided requested update. 02:25PM  Per chart review, pt has been admitted for [facility] services at this time.     VIRGINIE cancellhowie

## 2021-10-29 NOTE — PROGRESS NOTES
BRIEF PROGRESS NOTE    On 10/29/2021, I had a face to face encounter with Mario Albertokeiry Dario to evaluate need for a semi electric hospital bed.    Patient has a history of chronic lung disease, now end-stage, with acute hypoxemic respiratory failure, which would who is available, willing and able to provide assistance with the wheelchair. ADDENDUM   10/30/2021    DME orders canceled when patient transferred to inpatient hospice 10/29/2021.     Sumi Pablo MD

## 2021-10-30 PROBLEM — I13.10 CARDIORENAL SYNDROME WITH RENAL FAILURE: Status: ACTIVE | Noted: 2021-01-01

## 2021-10-30 NOTE — H&P
BRIEF ADMITTING NOTE    Patient seen and examined face-to-face in person in her room 470 at [facility] unit 4SE on 10/30/2021. Chart reviewed. Discussed with Dana [id number] daughter devora and son Desean Rosario.     42-year-old lady with progressive shortness of b

## 2021-10-30 NOTE — HOSPICE RN NOTE
Pt received in bed surrounded by family. Family relates morphine 1 mg ivp given an hour ago with improved results. Discussion of s/sx of pain and respiratory distress with family verbalizing understanding.   Family instructed that comfort meds will not ha

## 2021-10-30 NOTE — PLAN OF CARE
Pt transferred from CV to floor for inpatient hospice care. Pt opens eyes to speech but is very lethargic. Given morphine for pain and ativan for restlessness/anxiety. Safety precautions in place. Comfort measures provided. Family at bedside.  Frequent roun psychological comfort and peace  Description: INTERVENTIONS:  - Assess patient/family anxiety and grief process related to end of life issues  - Provide emotional and spiritual support  - Provide information about the patient’s health status with Tianna

## 2021-10-30 NOTE — HOSPICE RN NOTE
Pt received in bed with two family members bedside. Pt appears mostly comfortable with occasional irregular breathing. Mild left JVD noted. Unclear if mottling beginning on fingers and one toe.  PHILLIP Mcconnell states family declining ivp morphine at this ti

## 2021-10-30 NOTE — H&P
[facility]    HISTORY AND PHYSICAL EXAMINATION    Norberto Aid Patient Status:  Inpatient    1922 MRN [id number]   Location [facility] 3W/SW Attending Petra Cooper MD   [address] Day # 1 PCP Maria Elena Mendes MD, MD     Patient seen semirecumbent in bed. No audible wheezes. Abdomen grossly nondistended. No incontinence odor. No pitting lower extremity edema. Motor exam grossly nonfocal.  Tired affect, normal behavior.   Data:  Reports from [facility], [facility], [facility] · Proxy agent - Josefina Jennings Daughter Jessica Tovar documented in writing on 9/22/2017. · Rescue advance directives - Full DNR, selective treatment, documented on POLST form 11/9/2018. · Short-term plan - Inpatient hospice.   · Long-term plan - LTC from family and coco

## 2021-10-30 NOTE — HOSPICE RN NOTE
[facility] Inpatient nurse rounds:    Pt visit conducted with daughter and son at bedside. Pt alert x's 1-2, lethargic. On 3L NC 02, dyspnea at rest, pursed lip breathing, RR=24. Iv patent, pt c/o pain 5/10 to bilat hips. purewick in place.  Discus

## 2021-10-30 NOTE — DISCHARGE SUMMARY
[place] [facility] - [facility]    Discharge Summary    Meka Puckett Patient Status:  Inpatient    1922 MRN [id number]   Location [facility] [place] 3W/SW Attending No att. providers found   [facility] Day # 6 PCP Alec García MD, MD     Date of Admission: 10 discharge. History of Present Illness:  51-year-old lady with progressive shortness of breath over the last week or 2 PTA.   Followed by Dr. Walalce Mcknight at her home with [facility], who have been working on fluid management at home. [facility]

## 2021-10-30 NOTE — PROGRESS NOTES
[place] [facility] - [facility]    PROGRESS NOTE    Bourne Mina Patient Status:  Inpatient    1922 MRN [id number]   Location [facility] 3W/SW Attending Larena Brunner, MD   [facility] Day [facility] PCP Laura Haddad MD, MD     Patient seen and examined face-t heart failure.  Diastolic, multifactorial, including systemic and pulmonary hypertensive heart disease, mitral and tricuspid regurgitation, compounded by atrial fibrillation with loss of atrial systole. Fluid overload (not dyspnea) resolved with diuresis.

## 2021-10-31 NOTE — PLAN OF CARE
Patient resting comfortably in bed, 3L of O2 for comfort, family at bedside, repositioned q1-2hrs PRN, purewick in place (anuric). Unresponsive, immobile, fall precautions in place.  Morphine 1mg given x2 and Ativan 0.5mg x1 with relief of breathing discomf support with 1:1 interaction with staff  Outcome: Not Progressing     Problem: DEATH & DYING  Goal: Pt/Family communicate acceptance of impending death and feel psychological comfort and peace  Description: INTERVENTIONS:  - Assess patient/family anxiety a

## 2021-10-31 NOTE — SIGNIFICANT EVENT
Patient resting comfortable in bed. Repositioned as tolerated. 3L oxygen for comfort. Prn oral care. Purewick in place. Family at bedside. Prn IV morphine push. Comfort and end of life care provided.     Patient  at  with family present at the be

## 2021-11-01 NOTE — PAYOR COMM NOTE
10/30/21 Patient  at 2214 with family present at the bedside. PT WAS INPT HOSPICE THIS DAY.      DISCHARGE REVIEW    Payor: [employer]  Subscriber #:  [id number]  Authorization Number: [id number]    Admit date: 10/23/21  Admit time:   8:28 PM  Ras Green humerus     Other closed displaced fracture of proximal end of right humerus, initial encounter     Intractable back pain     Vertebral compression fracture (HCC)     CHF (congestive heart failure) ([address]     Pulmonary HTN ([address]     Pulmonary hypertension ( and Ludivina Melendez, pulmonary Dr. Mark Hinton. Procedures: 2D, Doppler and bubble echocardiograms, IV diuretic therapy. Complications: Cardiorenal syndrome.     Discharge Condition: Terminal.       Discharge Medications      You have not been prescribed any medicat

## 2021-11-05 NOTE — DISCHARGE SUMMARY
[place] [facility] - [facility]    Discharge Summary    Lena Bowen Patient Status:  Inpatient    1922 MRN [id number]   Location [facility] 3W/SW Attending No att. providers found   [facility] Day # 1 PCP Melissa Forbes MD, MD     Date of Admission: 10 admission.  Followed by Dr. Rajat Sanon at her home with [facility], who had been working on fluid management at home.  Compliance with low sodium diet and adjustment of diuretics failed to control symptoms at home.  Seen in ER 10/23/2021

## 2021-11-05 NOTE — DISCHARGE SUMMARY
The original note was entered in error and has been removed. To inquire about the original note, please contact the [facility] Department at ([phone]).

## 2025-02-25 NOTE — PROGRESS NOTES
HPI:    Patient ID: Kim Camilo is a 80year old female. Hypertension   This is a chronic problem. The current episode started more than 1 year ago. The problem is unchanged. The problem is controlled.  Pertinent negatives include no anxiety, blurred v sores, hematuria and urgency. Musculoskeletal: Negative for neck pain and neck stiffness. Allergic/Immunologic: Negative for immunocompromised state.    Neurological: Negative for dizziness, syncope, facial asymmetry, weakness, light-headedness, numbnes Known Allergies   PHYSICAL EXAM:   Physical Exam   Constitutional: She is oriented to person, place, and time. She appears well-developed.    HENT:   Mouth/Throat: Oropharynx is clear and moist.   Eyes: EOM are normal. Pupils are equal, round, and reactive [Post-Operative Visit] : a post-operative visit [FreeTextEntry2] : s/p bilateral maxillary, frontal, sphenoid balloon sinuplasty; bilateral ethmoidectomy 02/18/25

## (undated) NOTE — IP AVS SNAPSHOT
Patient Demographics     Address  [address] [place] [address] Phone  [phone] [facility])      Emergency Contact(s)     Name Relation Home Work [address] Daughter [phone]        Allergies as of 11/9/2018  Reviewed on: 11/4/2018 Next dose due:  Anytime as needed      Take 1-2 tablets by mouth every 4 (four) hours as needed.    MINERVA Osborne         Irbesartan 300 MG Tabs  Commonly known as:  AVAPRO  Next dose due:  11/9/18 bedtime      Take 1 tablet (300 mg total) by mouth [id number] HYDROcodone-acetaminophen (NORCO) 5-325 MG per tab 2 tablet 11/08/18 2139 Given      [id number] HYDROcodone-acetaminophen (NORCO) 5-325 MG per tab 2 tablet 11/09/18 0354 Given      [id number] HYDROcodone-acetaminophen (NORCO) 5-325 MG per tab 2 ta Glucose Urine Negative Negative mg/dL [facility]   Ketones Urine Negative Negative mg/dL [employer]   Bilirubin Urine Negative Negative — [facility]   Blood Urine Negative Negative — [facility]   Nitrite Urine Negative Negative — [employer] outstretched right arm. Pt then had pain in right arm and x-rays in ER reveal right proximal humerus fx. No CP. No SOB. No f/c. No n/v/c/d. Pt has some pain in the right arm relieved with morphine. Pt denies significant \"tailebone\" pain.     Hi Lab Results   Component Value Date    WBC 12.3 (H) 11/04/2018    HGB 10.6 (L) 11/04/2018    HCT 32.0 (L) 11/04/2018     11/04/2018    CREATSERUM 1.07 11/04/2018    BUN 40 (H) 11/04/2018     11/04/2018    K 4.0 11/04/2018     11/04/2018 Consults - MD Consult Notes      Consults filed by Vaughn Caro MD at 11/5/2018  9:13 PM / Draft: Not Electronically Signed     Author:  Vaughn Caro MD Service:  Marylou Bragg Type:  Physician    Filed:  11/5/2018  9:13 PM Status:  Lolita Kemp the lateral shoulder. Examination of the left upper extremity and bilateral lower extremity shows these extremities to be normal to inspection, palpation and range of motion testing. IMAGING:  X-rays of the right shoulder were reviewed.   AP and lateral History of Present Illness[JH.1]     Brigette Mendoza[JH.2] is a(n)[JH.3 80year old[JH.2] female with a history of atrial fibrillation liquids, hypertension, CHF who presents to hospital after falling on her right arm.   She had a lot of pain in the arm aft CHANGE how you take these medications      Instructions Prescription details   ALPRAZolam 0.25 MG Tabs  Commonly known as:  [facility]  What changed:    · how much to take  · how to take this  · when to take this  · reasons to take this  · additional instructio Refills:  0        STOP taking these medications    HYDROcodone-acetaminophen  MG Tabs  Commonly known as:  NORCO  Replaced by:  HYDROcodone-acetaminophen 5-325 MG Tabs              Where to Get Your Medications      Please  your prescription PM  Version 1 of 1    Author:  Declan Scott PTA Service:  Physical Medicine and Rehabilitation Author Type:  Physical Therapist    Filed:  11/8/2018  1:24 PM Date of Service:  11/8/2018  1:19 PM Status:  Signed    :  Declan Scott PTA (Physi Patient will benefit from continued IP PT services to address these deficits in preparation for discharge.       DISCHARGE RECOMMENDATIONS  PT Discharge Recommendations: Sub-acute rehabilitation     PLAN  PT Treatment Plan: Gait training;Transfer training;B Comment : anatlgic gait, slow and small oneil, dc balance noted,inc lean on walker    Additional information:     THERAPEUTIC EXERCISES  Lower Extremity Alternating marching  Ankle pumps  Knee extension     Position Sitting       Patient End of Session: ASSESSMENT   Patient received supine in bed; agreeable to participate in therapy and dtr present in room. Reviewed weight bearing restrictions and assured appropriate placement of R sling. Patient is L hand dominant.  Patient requiring Min A  for supine to Static Sitting: Good  Dynamic Sitting: Fair +           Static Standing: Fair  Dynamic Standing: Fair -[DK. 2]    ACTIVITY TOLERANCE                         O2 WALK                  AM-PAC '6-Clicks' IN assistance level: supervision with alesia-walker     Goal #2  Current Status Min A   Goal #3 Patient is able to ambulate 100 feet with assist device: alesia-walker at assistance level: minimum assistance   Goal #3   Current Status 100 ft with alesia-walker with alesia-walker and education given on appropriate positioning of alesia-walker. Patient tolerated 1 minute static standing with LUE support on alesia-walker requiring Min A to maintain and cues for appropriate LE positioning.  Patient ambulated 20 ft bed to chair -   Turning over in bed (including adjusting bedclothes, sheets and blankets)?: A Little   -   Sitting down on and standing up from a chair with arms (e.g., wheelchair, bedside commode, etc.): A Little   -   Moving from lying on back to sitting on the side instructions provided to patient in preparation for discharge. Goal #4   Current Status In progress[DK. 1]          Attribution Key    DK. 1 - Aliza Amezcua, PTA on 11/6/2018 11:49 AM  DK. 2 - Alan Wu, PTA on 11/6/2018 11:50 AM OT Device Recommendations: (hip kit)[BA.2]    PLAN[BA. 1]  OT Treatment Plan: Balance activities; Energy conservation/work simplification techniques;ADL training;UE strengthening/ROM; Endurance training; Compensatory technique education[BA. 2]    SUBJECTIVE[BA. Dynamic Standing:[BA.1] fair[BA. 3]    FUNCTIONAL ADL ASSESSMENT  Grooming:[BA.1] supervision with set up[BA. 3]   Bathing:[BA.1] NT[BA. 3]  Toileting:[BA.1] min assist[BA.3]  Upper Body Dressing:[BA.1] pt declined, discussed type of clothing for over sling[B Occupational Therapy Note signed by Nathalie Araujo OT at 11/6/2018  1:41 PM  Version 1 of 1    Author:   Nathalie Araujo OT Service:  Rehab Author Type:  Occupational Therapist    Filed:  11/6/2018  1:41 PM Date of Service:  11/6/2018  1:03 PM Status: OT Device Recommendations: (hip kit)[ST.2]     PLAN[ST.1]  OT Treatment Plan: Balance activities; Energy conservation/work simplification techniques;ADL training;UE strengthening/ROM; Endurance training; Compensatory technique education[ST. 2]    SUBJECTIVE[ST Patient will complete functional transfer with CGA  Comment: unable to assess today, pt refuses to complete during session. Patient will complete toileting with CGA  Comment: unable to assess today, pt refuses to complete during session.      Patient w

## (undated) NOTE — MR AVS SNAPSHOT
[facility] SPECIALTY [facility] [address] [id number]  [phone]               Thank you for choosing us for your health care visit with Patricia Fuentes MD.  We are glad to serve you and happy to provide you with this summary of y Take 1 tablet (10 mg total) by mouth nightly. Commonly known as:  LIPITOR           ELIQUIS 2.5 MG Tabs   Generic drug:  apixaban   Take 2.5 mg by mouth 2 (two) times daily.            HYDROcodone-acetaminophen  MG Tabs   Take 1 tablet by mouth ever Visit [facility] online at  [email or web address]

## (undated) NOTE — ED AVS SNAPSHOT
Jerardo Chantale   MRN: [id number]    Department:  [facility] Emergency Department   Date of Visit:  1/1/2018           Disclosure     Insurance plans vary and the physician(s) referred by the ER may not be covered by your plan.  Please contact you within the next three months to obtain basic health screening including reassessment of your blood pressure.     IF THERE IS ANY CHANGE OR WORSENING OF YOUR CONDITION, CALL YOUR PRIMARY CARE PHYSICIAN AT ONCE OR RETURN IMMEDIATELY TO THE EMERGENCY DEPARTMEN

## (undated) NOTE — IP AVS SNAPSHOT
[facility]            (For Outpatient Use Only) Initial Admit Date: 1/2/2019   Inpt/Obs Admit Date: Inpt: 1/3/19 / Obs: 01/03/19   Discharge Date:    Karlee Johnson:  [de-identified]   MRN: [de-identified]   CSN: [id number]        ENCOUNTER  Patient Cl January 8, 2019

## (undated) NOTE — IP AVS SNAPSHOT
[facility]            (For Outpatient Use Only) Initial Admit Date: 11/4/2018   Inpt/Obs Admit Date: Inpt: 11/4/18 / Obs: N/A   Discharge Date:    Carylon Kawasaki:  [de-identified]   MRN: [de-identified]   CSN: [id number]        ENCOUNTER  Patient Class Hospital Account Financial Class: Medicare Advantage    November 9, 2018

## (undated) NOTE — IP AVS SNAPSHOT
Patient Demographics     Address  [address] [place] [address] Phone  [phone] [facility])      Emergency Contact(s)     Name Relation Home Work Mobile    Law Daughter [phone]        Allergies as of 1/8/2019  Reviewed on: 1/3/2019   N hydrochlorothiazide 25 MG Tabs  Commonly known as:  HYDRODIURIL  Next dose due:  1/9/2019      Take 1 tablet (25 mg total) by mouth daily.    Fredy Roberts MD         HYDROcodone-acetaminophen 7.5-325 MG Tabs  Commonly known as:  [address]  Notes to patient:  L [id number] AmLODIPine Besylate (NORVASC) tab 7.5 mg 01/08/19 1010 Given      [id number] HYDROcodone-acetaminophen (NORCO) 5-325 MG per tab 1 tablet 01/08/19 1641 Given  prophylaxis for transport    [id number] HYDROcodone-acetaminophen (NORCO) 7.5-325 MG per Microbiology Results (All)     None         H&P - H&P Note      H&P signed by Wesley Lees MD at 1/6/2019  4:11 PM  Version 1 of 1    Author:  Wesley Lees MD Service:  Internal Medicine Author Type:  Physician    Filed:  1/6/2019  4:11 PM Date of Servic ROS:  No report of fever, acute pain, skin breakdown or any acute respiratory, GI, urinary, gynecologic, cardiovascular, hematologic, endocrine, neurologic or musculoskeletal symptoms  except as noted above in CC/HPI. EXAMINATION   PX:[GM.1] Alert.   No ? DM 2: Good BG control. Keep same Rx. Continue home monitoring. Watch A1c once every 6 months. [GM.4]   All/Imm ? Allergies:[GM.1] NKDA. [GM.5]   Vision ? Hearing ? Neuro ? Psych[GM.1] ? Insomnia:[GM.4] Try to reduce nocturia. [GM.6]  Continue me Hosp Day # 3 PCP Donald Meza MD     Date of Admission:  1/2/2019  Date of Consult:  1/6/2019    Reason for Consultation:   Right shoulder pain    History of Present Illness:   Ran Lisa is a 80year old female who complains of right shoulder pain and She also complains of ongoing right shoulder pain at this time. She has been under Dr. Lynne Laguerre care for a closed right proximal humerus fracture status post a fall on 11/4/18[LR.1].  She was evaluated by Dr. Charley Robles in the office and conservative care f • Intervertebral disc stenosis of neural canal of lumbar region    • Osteoarthritis of shoulder     \"Drain/inject\"   • [facility] 11[facility]16    by ECHO   • Radiculopathy     Left   • Rotator cuff tear    • Sciatica    • Sinusitis    • Tendonitis PEG 3350 (MIRALAX) powder packet 17 g 17 g Oral Once per day on Mon Wed Fri   Senna-Docusate Sodium (SENOKOT S) 8.6-50 MG tab 1 tablet 1 tablet Oral Daily   vitamin A (AQUASOL A) cap 10,000 Units 10,000 Units Oral Daily   Cyclobenzaprine HCl (FLEXERIL) tab 153 lb 3.2 oz (69.5 kg), SpO2 93 %, not currently breastfeeding. General: Well nourished elderly female in no acute distress. AO x 3. Cooperative. Able to follow commands and answer questions appropriately.      Neck: On further examination of the cervic right shoulder is to approximately 45 degrees. Passive external rotation of the right shoulder is to approximately 50 degrees with pain.  Passive internal rotation of the right shoulder with the right shoulder abducted to 65 or 70 degrees is to approximatel again there is significant callus formation consistent with healing. [LR.1] There is no change in the alignment of the fracture when compared to x-rays of the right shoulder taken in the ER on 11/4/18. [LR.2] There is severe degenerative change of the glenoh Right shoulder pain s/p right proximal humerus fracture 8 weeks ago: Right shoulder x-rays with[LR.1] good maintenance of the alignment of the right proximal humerus fracture and[LR.2] abundant callus formation consistent with fracture healing.  Right shoul Currently[LR.1], her[LR.2] mid back pain[LR.1] is[LR.2] well controlled with oral pain medications. [LR.1] Her b[LR.2]ack pain[LR.1] is[LR.2] significantly improved from[LR.1] her[LR.2] admission date[LR.1] to the hospital[LR.2]. [LR.1] I r[LR.2]ecommend con moderately enlarged. Heavy mitral annular calcifications noted. MEDIAST/DIANE:   The aorta is elongated and tortuous with atherosclerotic plaques. No visible mass or adenopathy.  LUNGS/PLEURA: Markings are diffusely prominent consistent with scarring/atelec Room: [facility] Accession: [id number] HT:(62in) WT:(150.7lb)                      BP: 158 / 63 ------------------------------------------------------------------- Indications:       Follow up on CHF, MR. ----------------------------------------------------- complications. Transthoracic echocardiography. M-mode, complete 2D, complete spectral Doppler, and color Doppler. Patient YOB: 1922. Patient is 89yr old. Gender: female. BMI: 27.6kg/m^2. BSA: 1.75m^2. Patient status:  Observation.   Study date effusion. Quality of study:  Image quality was adequate. Systemic veins: Inferior vena cava: The vessel was normal in size.  The respirophasic diameter changes were in the normal range (= 50%), consistent with normal central venous pressure. --------------- Value        11/26/2016 Reference  Mitral E-wave peak               122   cm/sec ---------- ---------  velocity  Mitral A-wave peak               39    cm/sec ---------- ---------  velocity  Mitral deceleration time         232   ms     --------- Patient continues to c/o mid-back pain. Transfers: Mod A x 1 sit>stand, Min A x 1 stand to sit. Sit>stand task was labored requiring increased time to complete, cues for widening base of support and UE positioning.  Upon standing increased time required fo AM-PAC '6-Clicks' INPATIENT SHORT FORM - BASIC MOBILITY  How much difficulty does the patient currently have. ..  -   Turning over in bed (including adjusting bedclothes, sheets and blankets)?: A Little   -   Sitting down on and standing up from a chair wit Physical Therapy Note signed by Cher Godinez PT at 1/7/2019  6:03 PM  Version 1 of 1    Author:  Cher Godinez PT Service:  Physical Medicine and Rehabilitation Author Type:  Physical Therapist    Filed:  1/7/2019  6:03 PM Date of Service:  1/7/20 decreased tolerance toward activity, patient unsafe to return home alone as she poses at increased risk for fall. Patient has balance deficits and continued RUE ROM and strength deficits associated with humeral fracture in November.  Spoke to RN in regards -   Moving to and from a bed to a chair (including a wheelchair)?: A Little   -   Need to walk in hospital room?: A Little   -   Climbing 3-5 steps with a railing?: A Lot     AM-PAC Score:  Raw Score: 16   Approx Degree of Impairment: 54.16%   Standardized Rehabilitation Author Type:  Physical Therapist    Filed:  1/7/2019  1:53 PM Date of Service:  1/7/2019  1:51 PM Status:  Signed    :  Ronan Hyman PT (Physical Therapist)       Attempted PT treatment session; pt supine in bed- pt's dtr present HEP was established with return demo. She was encouraged to perform her HEP everyday, to sit up on the chair with nursing assistance and to ambulate with nursing staff to assist in her recovery process. Back precautions reviewed. [AN.3]  The patient's Ahmet • Unspecified essential hypertension    • UTI (urinary tract infection)        Past Surgical History  Past Surgical History:   Procedure Laterality Date   • DRAIN/INJECT LARGE JOINT/BURSA     • HEMORRHOIDECTOMY     • HYSTERECTOMY         HOME SITUATION  Ty -   Moving to and from a bed to a chair (including a wheelchair)?: A Little   -   Need to walk in hospital room?: A Little   -   Climbing 3-5 steps with a railing?: A Lot     AM-PAC Score:  Raw Score: 18   Approx Degree of Impairment: 46.58%   Standardized VALERIE - Marin Guillen, PT on 1/6/2019 11:57 AM  VICKI Blanco - Marin Guillen PT on 1/6/2019 12:05 PM                        Occupational Therapy Notes (last 72 hours) (Notes from 1/5/2019  5:52 PM through 1/8/2019  5:52 PM)      Occupational Therapy Note signed by LIAM time. Pt returned to bedside chair, alarm set, all needs within reach. Pt requires mod-max a for LB dressing, 2/2 decreased fx reach. [ST.3] The patient is below baseline and would benefit from skilled inpatient OT to address the above deficits, maximizing • HEMORRHOIDECTOMY     • HYSTERECTOMY[ST.2]         HOME SITUATION[ST. 1]  Type of Home: House  Home Layout: Two level  Lives With: Alone     Toilet and Equipment: Comfort height toilet  Shower/Tub and Equipment: Tub-shower combo; Shower chair  Other Equipme ACTIVITIES OF DAILY LIVING ASSESSMENT  AM-PAC ‘6-Clicks’ Inpatient Daily Activity Short Form  How much help from another person does the patient currently need…[ST.1]  -   Putting on and taking off regular lower body clothing?: A Lot  -   Bathing (includin twylas IDL[QH.6] Mod I[ST. 3]   Comment:    Patient will complete item retrieval with[ST.1] Mod I[ST. 3]   Comment:          Goals  on:[ST.1] 1/15/19[ST. 3]  Frequency:[ST.1] 3-5x week    Sondra Kawasaki OTR/L  [facility][ST. 3]        Attribution

## (undated) NOTE — MR AVS SNAPSHOT
[facility] [place] [address] [id number]  [phone]               Thank you for choosing us for your health care visit with Caleb Verdin MD.  We are glad to serve you and happy to provide you with this summary of y Commonly known as:  XANAX           AmLODIPine Besylate 5 MG Tabs   Take 1 tablet (5 mg total) by mouth daily. Commonly known as:  NORVASC           Atorvastatin Calcium 10 MG Tabs   Take 1 tablet (10 mg total) by mouth nightly.    Commonly known as:  LIP

## (undated) NOTE — ED AVS SNAPSHOT
Norberto Hammond   MRN: [id number]    Department:  [facility]   Date of Visit:  1/6/2018           Disclosure     Insurance plans vary and the physician(s) referred by the ER may not be covered by your plan.  Please contact you within the next three months to obtain basic health screening including reassessment of your blood pressure.     IF THERE IS ANY CHANGE OR WORSENING OF YOUR CONDITION, CALL YOUR PRIMARY CARE PHYSICIAN AT ONCE OR RETURN IMMEDIATELY TO THE EMERGENCY DEPARTMEN